# Patient Record
Sex: MALE | Race: WHITE | Employment: FULL TIME | URBAN - METROPOLITAN AREA
[De-identification: names, ages, dates, MRNs, and addresses within clinical notes are randomized per-mention and may not be internally consistent; named-entity substitution may affect disease eponyms.]

---

## 2023-03-29 ENCOUNTER — APPOINTMENT (OUTPATIENT)
Dept: CT IMAGING | Age: 28
End: 2023-03-29
Payer: COMMERCIAL

## 2023-03-29 ENCOUNTER — HOSPITAL ENCOUNTER (OUTPATIENT)
Age: 28
Setting detail: OBSERVATION
Discharge: HOME | End: 2023-03-31
Attending: EMERGENCY MEDICINE | Admitting: INTERNAL MEDICINE
Payer: COMMERCIAL

## 2023-03-29 ENCOUNTER — APPOINTMENT (OUTPATIENT)
Dept: GENERAL RADIOLOGY | Age: 28
End: 2023-03-29
Payer: COMMERCIAL

## 2023-03-29 DIAGNOSIS — R07.9 CHEST PAIN, UNSPECIFIED TYPE: Primary | ICD-10-CM

## 2023-03-29 DIAGNOSIS — R77.8 ELEVATED TROPONIN: ICD-10-CM

## 2023-03-29 LAB
ALBUMIN SERPL-MCNC: 4.9 G/DL (ref 3.5–4.6)
ALP SERPL-CCNC: 83 U/L (ref 35–104)
ALT SERPL-CCNC: 29 U/L (ref 0–41)
AMPHET UR QL SCN: NORMAL
ANION GAP SERPL CALCULATED.3IONS-SCNC: 10 MEQ/L (ref 9–15)
AST SERPL-CCNC: 24 U/L (ref 0–40)
BARBITURATES UR QL SCN: NORMAL
BASOPHILS # BLD: 0 K/UL (ref 0–0.2)
BASOPHILS NFR BLD: 0.6 %
BENZODIAZ UR QL SCN: NORMAL
BILIRUB SERPL-MCNC: 0.4 MG/DL (ref 0.2–0.7)
BUN SERPL-MCNC: 19 MG/DL (ref 6–20)
CALCIUM SERPL-MCNC: 9.9 MG/DL (ref 8.5–9.9)
CANNABINOIDS UR QL SCN: NORMAL
CHLORIDE SERPL-SCNC: 104 MEQ/L (ref 95–107)
CK SERPL-CCNC: 141 U/L (ref 0–190)
CO2 SERPL-SCNC: 27 MEQ/L (ref 20–31)
COCAINE UR QL SCN: NORMAL
CREAT SERPL-MCNC: 0.98 MG/DL (ref 0.7–1.2)
DRUG SCREEN COMMENT UR-IMP: NORMAL
EOSINOPHIL # BLD: 0.1 K/UL (ref 0–0.7)
EOSINOPHIL NFR BLD: 1.2 %
ERYTHROCYTE [DISTWIDTH] IN BLOOD BY AUTOMATED COUNT: 13 % (ref 11.5–14.5)
FENTANYL SCREEN, URINE: NORMAL
GLOBULIN SER CALC-MCNC: 2.7 G/DL (ref 2.3–3.5)
GLUCOSE SERPL-MCNC: 81 MG/DL (ref 70–99)
HCT VFR BLD AUTO: 43.9 % (ref 42–52)
HGB BLD-MCNC: 14.9 G/DL (ref 14–18)
INR PPP: 1
LYMPHOCYTES # BLD: 1.8 K/UL (ref 1–4.8)
LYMPHOCYTES NFR BLD: 25.2 %
MCH RBC QN AUTO: 28.3 PG (ref 27–31.3)
MCHC RBC AUTO-ENTMCNC: 34 % (ref 33–37)
MCV RBC AUTO: 83.3 FL (ref 79–92.2)
METHADONE UR QL SCN: NORMAL
MONOCYTES # BLD: 0.4 K/UL (ref 0.2–0.8)
MONOCYTES NFR BLD: 5.9 %
NEUTROPHILS # BLD: 4.9 K/UL (ref 1.4–6.5)
NEUTS SEG NFR BLD: 67.1 %
OPIATES UR QL SCN: NORMAL
OXYCODONE UR QL SCN: NORMAL
PCP UR QL SCN: NORMAL
PLATELET # BLD AUTO: 263 K/UL (ref 130–400)
POTASSIUM SERPL-SCNC: 4.3 MEQ/L (ref 3.4–4.9)
PROPOXYPH UR QL SCN: NORMAL
PROT SERPL-MCNC: 7.6 G/DL (ref 6.3–8)
PROTHROMBIN TIME: 13 SEC (ref 12.3–14.9)
RBC # BLD AUTO: 5.26 M/UL (ref 4.7–6.1)
SODIUM SERPL-SCNC: 141 MEQ/L (ref 135–144)
TROPONIN T SERPL-MCNC: 0.04 NG/ML (ref 0–0.01)
TROPONIN T SERPL-MCNC: 0.05 NG/ML (ref 0–0.01)
TROPONIN T SERPL-MCNC: 0.08 NG/ML (ref 0–0.01)
WBC # BLD AUTO: 7.3 K/UL (ref 4.8–10.8)

## 2023-03-29 PROCEDURE — 71275 CT ANGIOGRAPHY CHEST: CPT

## 2023-03-29 PROCEDURE — G0378 HOSPITAL OBSERVATION PER HR: HCPCS

## 2023-03-29 PROCEDURE — 36415 COLL VENOUS BLD VENIPUNCTURE: CPT

## 2023-03-29 PROCEDURE — 2580000003 HC RX 258: Performed by: INTERNAL MEDICINE

## 2023-03-29 PROCEDURE — 6370000000 HC RX 637 (ALT 250 FOR IP): Performed by: EMERGENCY MEDICINE

## 2023-03-29 PROCEDURE — 84484 ASSAY OF TROPONIN QUANT: CPT

## 2023-03-29 PROCEDURE — 71046 X-RAY EXAM CHEST 2 VIEWS: CPT

## 2023-03-29 PROCEDURE — 99285 EMERGENCY DEPT VISIT HI MDM: CPT

## 2023-03-29 PROCEDURE — 6360000004 HC RX CONTRAST MEDICATION: Performed by: EMERGENCY MEDICINE

## 2023-03-29 PROCEDURE — 85610 PROTHROMBIN TIME: CPT

## 2023-03-29 PROCEDURE — 85025 COMPLETE CBC W/AUTO DIFF WBC: CPT

## 2023-03-29 PROCEDURE — 93005 ELECTROCARDIOGRAM TRACING: CPT

## 2023-03-29 PROCEDURE — 82550 ASSAY OF CK (CPK): CPT

## 2023-03-29 PROCEDURE — 80053 COMPREHEN METABOLIC PANEL: CPT

## 2023-03-29 PROCEDURE — 80307 DRUG TEST PRSMV CHEM ANLYZR: CPT

## 2023-03-29 RX ORDER — ENOXAPARIN SODIUM 100 MG/ML
40 INJECTION SUBCUTANEOUS DAILY
Status: DISCONTINUED | OUTPATIENT
Start: 2023-03-30 | End: 2023-03-31 | Stop reason: HOSPADM

## 2023-03-29 RX ORDER — ONDANSETRON 2 MG/ML
4 INJECTION INTRAMUSCULAR; INTRAVENOUS EVERY 6 HOURS PRN
Status: DISCONTINUED | OUTPATIENT
Start: 2023-03-29 | End: 2023-03-31 | Stop reason: HOSPADM

## 2023-03-29 RX ORDER — POLYETHYLENE GLYCOL 3350 17 G/17G
17 POWDER, FOR SOLUTION ORAL DAILY PRN
Status: DISCONTINUED | OUTPATIENT
Start: 2023-03-29 | End: 2023-03-31 | Stop reason: HOSPADM

## 2023-03-29 RX ORDER — SODIUM CHLORIDE 0.9 % (FLUSH) 0.9 %
5-40 SYRINGE (ML) INJECTION EVERY 12 HOURS SCHEDULED
Status: DISCONTINUED | OUTPATIENT
Start: 2023-03-29 | End: 2023-03-31 | Stop reason: HOSPADM

## 2023-03-29 RX ORDER — ACETAMINOPHEN 325 MG/1
650 TABLET ORAL EVERY 6 HOURS PRN
Status: DISCONTINUED | OUTPATIENT
Start: 2023-03-29 | End: 2023-03-31 | Stop reason: HOSPADM

## 2023-03-29 RX ORDER — SODIUM CHLORIDE 9 MG/ML
25 INJECTION, SOLUTION INTRAVENOUS PRN
Status: DISCONTINUED | OUTPATIENT
Start: 2023-03-29 | End: 2023-03-31 | Stop reason: HOSPADM

## 2023-03-29 RX ORDER — ACETAMINOPHEN 650 MG/1
650 SUPPOSITORY RECTAL EVERY 6 HOURS PRN
Status: DISCONTINUED | OUTPATIENT
Start: 2023-03-29 | End: 2023-03-31 | Stop reason: HOSPADM

## 2023-03-29 RX ORDER — SODIUM CHLORIDE 0.9 % (FLUSH) 0.9 %
5-40 SYRINGE (ML) INJECTION PRN
Status: DISCONTINUED | OUTPATIENT
Start: 2023-03-29 | End: 2023-03-31 | Stop reason: HOSPADM

## 2023-03-29 RX ORDER — ONDANSETRON 4 MG/1
4 TABLET, ORALLY DISINTEGRATING ORAL EVERY 8 HOURS PRN
Status: DISCONTINUED | OUTPATIENT
Start: 2023-03-29 | End: 2023-03-31 | Stop reason: HOSPADM

## 2023-03-29 RX ORDER — ASPIRIN 81 MG/1
324 TABLET, CHEWABLE ORAL ONCE
Status: COMPLETED | OUTPATIENT
Start: 2023-03-29 | End: 2023-03-29

## 2023-03-29 RX ORDER — NITROGLYCERIN 0.4 MG/1
0.4 TABLET SUBLINGUAL EVERY 5 MIN PRN
Status: DISCONTINUED | OUTPATIENT
Start: 2023-03-29 | End: 2023-03-30 | Stop reason: SDUPTHER

## 2023-03-29 RX ADMIN — Medication 10 ML: at 22:16

## 2023-03-29 RX ADMIN — ASPIRIN 81 MG 324 MG: 81 TABLET ORAL at 18:08

## 2023-03-29 RX ADMIN — NITROGLYCERIN 0.4 MG: 0.4 TABLET SUBLINGUAL at 18:20

## 2023-03-29 RX ADMIN — IOPAMIDOL 75 ML: 612 INJECTION, SOLUTION INTRAVENOUS at 17:26

## 2023-03-29 RX ADMIN — NITROGLYCERIN 0.4 MG: 0.4 TABLET SUBLINGUAL at 18:11

## 2023-03-29 ASSESSMENT — ENCOUNTER SYMPTOMS
ABDOMINAL PAIN: 0
CHEST TIGHTNESS: 0
EYE PAIN: 0
SORE THROAT: 0
SHORTNESS OF BREATH: 0
VOMITING: 0
WHEEZING: 0
NAUSEA: 0

## 2023-03-29 ASSESSMENT — PAIN DESCRIPTION - ONSET: ONSET: ON-GOING

## 2023-03-29 ASSESSMENT — PAIN DESCRIPTION - LOCATION: LOCATION: CHEST

## 2023-03-29 ASSESSMENT — PAIN SCALES - GENERAL: PAINLEVEL_OUTOF10: 4

## 2023-03-29 ASSESSMENT — PAIN - FUNCTIONAL ASSESSMENT: PAIN_FUNCTIONAL_ASSESSMENT: 0-10

## 2023-03-29 ASSESSMENT — PAIN DESCRIPTION - FREQUENCY: FREQUENCY: CONTINUOUS

## 2023-03-29 ASSESSMENT — PAIN DESCRIPTION - DESCRIPTORS: DESCRIPTORS: SHARP

## 2023-03-29 NOTE — ED PROVIDER NOTES
surgical history on file. CURRENT MEDICATIONS       Previous Medications    No medications on file       ALLERGIES     Patient has no allergy information on record. FAMILY HISTORY     No family history on file. SOCIAL HISTORY       Social History     Socioeconomic History    Marital status: Single       SCREENINGS                        PHYSICAL EXAM    (up to 7 for level 4, 8 or more for level 5)     ED Triage Vitals [03/29/23 1446]   BP Temp Temp Source Heart Rate Resp SpO2 Height Weight   (!) 147/70 98.6 °F (37 °C) Temporal 84 18 96 % 6' (1.829 m) 180 lb (81.6 kg)       Physical Exam  Vitals and nursing note reviewed. Constitutional:       General: He is not in acute distress. Appearance: He is well-developed. He is not ill-appearing or diaphoretic. HENT:      Head: Normocephalic and atraumatic. Right Ear: External ear normal.      Left Ear: External ear normal.      Nose: Nose normal.      Mouth/Throat:      Mouth: Mucous membranes are moist.      Pharynx: No oropharyngeal exudate. Eyes:      Extraocular Movements: Extraocular movements intact. Conjunctiva/sclera: Conjunctivae normal.      Pupils: Pupils are equal, round, and reactive to light. Neck:      Thyroid: No thyromegaly. Vascular: No JVD. Trachea: No tracheal deviation. Cardiovascular:      Rate and Rhythm: Normal rate and regular rhythm. Heart sounds: Normal heart sounds. No murmur heard. Pulmonary:      Effort: Pulmonary effort is normal. No respiratory distress. Breath sounds: Normal breath sounds. No wheezing. Abdominal:      General: Bowel sounds are normal.      Palpations: Abdomen is soft. Tenderness: There is no abdominal tenderness. There is no guarding. Musculoskeletal:         General: Normal range of motion. Cervical back: Normal range of motion and neck supple. Right lower leg: No edema. Left lower leg: No edema. Skin:     General: Skin is warm and dry. Physician            Percy Avila DO  03/29/23 3089

## 2023-03-29 NOTE — ED NOTES
Pt grandmother and Father at bedside for update. Pt has asperger's syndrome.  Unable to fully understand explanations     Iwona Bell LPN  15/97/29 Marino Villegas LPN  47/32/67 [id number]

## 2023-03-29 NOTE — ED NOTES
Pt states was at work and chest started to hurt. Pt states a \"stinging\" in his chest. Pt denies cardiac hx, but has hx of anxiety. Pt states has felt this kind of pain before.      Wallace Olivier  03/29/23 2872

## 2023-03-30 VITALS
DIASTOLIC BLOOD PRESSURE: 80 MMHG | TEMPERATURE: 98.2 F | WEIGHT: 180 LBS | BODY MASS INDEX: 24.38 KG/M2 | HEIGHT: 72 IN | SYSTOLIC BLOOD PRESSURE: 136 MMHG | HEART RATE: 87 BPM | OXYGEN SATURATION: 97 % | RESPIRATION RATE: 16 BRPM

## 2023-03-30 LAB
B PARAP IS1001 DNA NPH QL NAA+NON-PROBE: NOT DETECTED
B PERT.PT PRMT NPH QL NAA+NON-PROBE: NOT DETECTED
BASOPHILS # BLD: 0 K/UL (ref 0–0.2)
BASOPHILS NFR BLD: 1.1 %
C PNEUM DNA NPH QL NAA+NON-PROBE: NOT DETECTED
EKG ATRIAL RATE: 68 BPM
EKG ATRIAL RATE: 74 BPM
EKG P AXIS: 51 DEGREES
EKG P AXIS: 63 DEGREES
EKG P-R INTERVAL: 188 MS
EKG P-R INTERVAL: 192 MS
EKG Q-T INTERVAL: 386 MS
EKG Q-T INTERVAL: 400 MS
EKG QRS DURATION: 88 MS
EKG QRS DURATION: 90 MS
EKG QTC CALCULATION (BAZETT): 425 MS
EKG QTC CALCULATION (BAZETT): 428 MS
EKG R AXIS: 17 DEGREES
EKG R AXIS: 5 DEGREES
EKG T AXIS: 40 DEGREES
EKG T AXIS: 43 DEGREES
EKG VENTRICULAR RATE: 68 BPM
EKG VENTRICULAR RATE: 74 BPM
EOSINOPHIL # BLD: 0.2 K/UL (ref 0–0.7)
EOSINOPHIL NFR BLD: 3.5 %
ERYTHROCYTE [DISTWIDTH] IN BLOOD BY AUTOMATED COUNT: 13.3 % (ref 11.5–14.5)
FLUAV RNA NPH QL NAA+NON-PROBE: NOT DETECTED
FLUBV RNA NPH QL NAA+NON-PROBE: NOT DETECTED
HADV DNA NPH QL NAA+NON-PROBE: NOT DETECTED
HCOV 229E RNA NPH QL NAA+NON-PROBE: NOT DETECTED
HCOV HKU1 RNA NPH QL NAA+NON-PROBE: NOT DETECTED
HCOV NL63 RNA NPH QL NAA+NON-PROBE: NOT DETECTED
HCOV OC43 RNA NPH QL NAA+NON-PROBE: NOT DETECTED
HCT VFR BLD AUTO: 42.4 % (ref 42–52)
HGB BLD-MCNC: 14.4 G/DL (ref 14–18)
HMPV RNA NPH QL NAA+NON-PROBE: NOT DETECTED
HPIV1 RNA NPH QL NAA+NON-PROBE: NOT DETECTED
HPIV2 RNA NPH QL NAA+NON-PROBE: NOT DETECTED
HPIV3 RNA NPH QL NAA+NON-PROBE: NOT DETECTED
HPIV4 RNA NPH QL NAA+NON-PROBE: NOT DETECTED
LV EF: 60 %
LVEF MODALITY: NORMAL
LYMPHOCYTES # BLD: 1.3 K/UL (ref 1–4.8)
LYMPHOCYTES NFR BLD: 30.3 %
M PNEUMO DNA NPH QL NAA+NON-PROBE: NOT DETECTED
MCH RBC QN AUTO: 28.1 PG (ref 27–31.3)
MCHC RBC AUTO-ENTMCNC: 33.9 % (ref 33–37)
MCV RBC AUTO: 82.9 FL (ref 79–92.2)
MONOCYTES # BLD: 0.4 K/UL (ref 0.2–0.8)
MONOCYTES NFR BLD: 8 %
NEUTROPHILS # BLD: 2.5 K/UL (ref 1.4–6.5)
NEUTS SEG NFR BLD: 57.1 %
PERFORMED ON: NORMAL
PLATELET # BLD AUTO: 237 K/UL (ref 130–400)
POC CREATININE: 1 MG/DL (ref 0.8–1.3)
POC SAMPLE TYPE: NORMAL
RBC # BLD AUTO: 5.12 M/UL (ref 4.7–6.1)
RSV RNA NPH QL NAA+NON-PROBE: NOT DETECTED
RV+EV RNA NPH QL NAA+NON-PROBE: NOT DETECTED
SARS-COV-2 RNA NPH QL NAA+NON-PROBE: NOT DETECTED
WBC # BLD AUTO: 4.4 K/UL (ref 4.8–10.8)

## 2023-03-30 PROCEDURE — G0378 HOSPITAL OBSERVATION PER HR: HCPCS

## 2023-03-30 PROCEDURE — 96372 THER/PROPH/DIAG INJ SC/IM: CPT

## 2023-03-30 PROCEDURE — 94762 N-INVAS EAR/PLS OXIMTRY CONT: CPT

## 2023-03-30 PROCEDURE — 93017 CV STRESS TEST TRACING ONLY: CPT

## 2023-03-30 PROCEDURE — 6370000000 HC RX 637 (ALT 250 FOR IP): Performed by: INTERNAL MEDICINE

## 2023-03-30 PROCEDURE — 2580000003 HC RX 258: Performed by: INTERNAL MEDICINE

## 2023-03-30 PROCEDURE — 93005 ELECTROCARDIOGRAM TRACING: CPT

## 2023-03-30 PROCEDURE — 0202U NFCT DS 22 TRGT SARS-COV-2: CPT

## 2023-03-30 PROCEDURE — 6360000002 HC RX W HCPCS: Performed by: INTERNAL MEDICINE

## 2023-03-30 PROCEDURE — 93306 TTE W/DOPPLER COMPLETE: CPT

## 2023-03-30 PROCEDURE — 36415 COLL VENOUS BLD VENIPUNCTURE: CPT

## 2023-03-30 PROCEDURE — 85025 COMPLETE CBC W/AUTO DIFF WBC: CPT

## 2023-03-30 RX ORDER — NITROGLYCERIN 0.4 MG/1
0.4 TABLET SUBLINGUAL EVERY 5 MIN PRN
Status: DISCONTINUED | OUTPATIENT
Start: 2023-03-30 | End: 2023-03-31 | Stop reason: HOSPADM

## 2023-03-30 RX ORDER — ASPIRIN 81 MG/1
81 TABLET ORAL DAILY
Status: DISCONTINUED | OUTPATIENT
Start: 2023-03-30 | End: 2023-03-31 | Stop reason: HOSPADM

## 2023-03-30 RX ADMIN — ENOXAPARIN SODIUM 40 MG: 100 INJECTION SUBCUTANEOUS at 09:45

## 2023-03-30 RX ADMIN — ASPIRIN 81 MG: 81 TABLET, COATED ORAL at 09:45

## 2023-03-30 RX ADMIN — Medication 10 ML: at 21:22

## 2023-03-30 RX ADMIN — Medication 5 ML: at 09:00

## 2023-03-30 ASSESSMENT — PAIN SCALES - GENERAL
PAINLEVEL_OUTOF10: 0
PAINLEVEL_OUTOF10: 0

## 2023-03-30 NOTE — CARE COORDINATION
Case Management Assessment  Initial Evaluation    Date/Time of Evaluation: 3/30/2023 10:49 AM  Assessment Completed by: Chris Tran RN    If patient is discharged prior to next notation, then this note serves as note for discharge by case management. Patient Name: Lobito Schmidt                   YOB: 1995  Diagnosis: Chest pain [R07.9]  Elevated troponin [R77.8]  Chest pain, unspecified type [R07.9]                   Date / Time: 3/29/2023  4:11 PM    Patient Admission Status: Observation   Readmission Risk (Low < 19, Mod (19-27), High > 27): No data recorded  Current PCP: No primary care provider on file. PCP verified by CM? No (PCP LIST OFFERED. CHOSE ANY PCP AT [address] [place] [address])    Chart Reviewed: Yes      History Provided by: Patient  Patient Orientation: Alert and Oriented, Place, Situation, Person, Self    Patient Cognition: Alert    Hospitalization in the last 30 days (Readmission):  No    If yes, Readmission Assessment in CM Navigator will be completed. Advance Directives:      Code Status: Full Code   Patient's Primary Decision Maker is: Legal Next of Kin    Primary Decision Maker: Christa Triplett - [phone]    Discharge Planning:    Patient lives with: Family Members Type of Home: House  Primary Care Giver: Self  Patient Support Systems include: Parent, Family Members   Current Financial resources:    Current community resources:    Current services prior to admission: None            Current DME:              Type of Home Care services:  None    ADLS  Prior functional level: Independent in ADLs/IADLs  Current functional level: Independent in ADLs/IADLs    PT AM-PAC:   /24  OT AM-PAC:   /24    Family can provide assistance at DC: Yes  Would you like Case Management to discuss the discharge plan with any other family members/significant others, and if so, who?  No  Plans to Return to Present Housing: Yes  Other Identified Issues/Barriers to RETURNING to current housing: Writer contacted Dr. Taisha Vuong office at [phone] to request pre op orders. No answer as office closes at 3:30 pm on Wednesdays. Voicemail message left to fax pre op orders with name of performing surgeon, requirements for pre op, labs etc., type of surgery, date and location of surgery  to fax #[phone].   Noemy Noe RN N/A  Potential Assistance needed at discharge: N/A            Potential DME:    Patient expects to discharge to: House  Plan for transportation at discharge:      Financial    Payor: [address]  [address] / Plan: [address]  [address] / Product Type: *No Product type* /     Does insurance require precert for SNF: Yes    Potential assistance Purchasing Medications:    Meds-to-Beds request: Yes    [facility] [place] [address]    Notes:    Factors facilitating achievement of predicted outcomes: Friend support, Motivated, and Cooperative    Barriers to discharge: N/A    Additional Case Management Notes: HOME WITH DAD. INDEPENDENT. NO 02. The Plan for Transition of Care is related to the following treatment goals of Chest pain [R07.9]  Elevated troponin [R77.8]  Chest pain, unspecified type [J76.3]    IF APPLICABLE: The Patient and/or patient representative Annita Browning and his family were provided with a choice of provider and agrees with the discharge plan. Freedom of choice list with basic dialogue that supports the patient's individualized plan of care/goals and shares the quality data associated with the providers was provided to:     Patient Representative Name:       The Patient and/or Patient Representative Agree with the Discharge Plan?       Neville Jo RN  Case Management Department

## 2023-03-30 NOTE — PROGRESS NOTES
Patient while down for the stress test discussed with this nurse that he may need to speak to psych about his very specific and vivid dreams. He described a dream where he was watching flesh burn and could hear people screaming and that they were realistic enough to think that is was real. He also described a time where he thought he was being electrocuted and was yelling he was innocent.  Migue Sol RN was notified of these concerns and plans to discuss with the hospitalist.

## 2023-03-30 NOTE — PROGRESS NOTES
Hx,allergies and medications reviewed. Procedure explained and informed consent obtained. Tolerated treadmill well for 11:37 minutes. Reached 95 % target HR. SOB noted. Returned to baseline in recovery. Denies chest pain or pressure. EKG shows no noted ectopy. Doctor to further review and interpret results.

## 2023-03-30 NOTE — CONSULTS
No femoral bruits. Good distal pulses. Edema. Negative Homans' sign. No calf tenderness elicited. Patient underwent CT scanning of the chest which for the most part is unremarkable. No evidence of pulmonary embolism nor dissection. No pleural effusions. EKG--as described above. First 1 showed a great deal of lead reversal.  Other 1 this morning show evidence of early repolarization without reciprocal changes. Monitor overnight did show evidence of Wenckebach phenomenon. Other lab work is unremarkable. His drug screen was negative. Troponins have a small trend of elevation, CPK is normal.    Past medical history: Outside of the patient's Asperger's, negative. She has no prior hospital admissions. Social history:  As described above, no evidence of tobacco abuse, alcohol abuse, or drug abuse. Patient lives at home with his father. Other review of systems:  Patient apparently does snore. However, no diagnosis of obstructive apnea is noted    Assessment:  Patient has mild elevation of troponins, but normal CPK. EKG shows early repolarization, but not necessarily ischemia. Patient also has Adrian Chalet phenomena at night. His history is not consistent with classic angina, and given his age, he has relatively few risk factors for coronary artery disease. Dissections are rare, but still possibility exists. EKG I agree with the interpretation of early repolarization and not necessarily ischemia as there is no reciprocal changes. We can block at night could be consistent with obstructive sleep apnea-the patient does snore. He does not have the typical body habitus, but studies show that roughly 40% of patients who have sleep apnea do not necessarily have to be particularly overweight. Plan:   This patient at least needs an echocardiogram to look for structural abnormalities  Patient deserves a least a stress test to look for evidence of ischemia  Patient also should have at least a

## 2023-03-30 NOTE — H&P
Hospitalist Group   History and Physical      CHIEF COMPLAINT: Chest pain    History of Present Illness:  28 y.o. male with no past medical history presents with chest pain. Patient was at work doing exertion when he started having sharp pain in the center of his chest.  Pain does not radiate anywhere. No associated symptoms other than mild nausea. No shortness of breath and no diaphoresis. Pain improved and happened again with more intensity while he was at rest.  He said that total of 4 times it happened but in between it was continuous with mild aching. He denied dizziness. Patient said that he had similar episode couple of years ago when he was exerting himself as well. Patient denied working out or exercising. Denied drug or tobacco use. No regular alcohol use. He mentioned that he drinks energy drinks regularly and he had 2 energy drinks earlier today. Patient also mentioned that sometimes he feels palpitation after drinking energy drinks. However, this time it was not palpitation that he felt. No family history of early onset cardiac disease. REVIEW OF SYSTEMS:  no fevers, chills, cp, sob, n/v, ha, vision/hearing changes, wt changes, hot/cold flashes, other open skin lesions, diarrhea, constipation, dysuria/hematuria unless noted in HPI. Complete ROS performed with the patient and is otherwise negative. PMH:  No past medical history on file. Surgical History:  No past surgical history on file. Medications Prior to Admission:    Prior to Admission medications    Not on File       Allergies:    Patient has no allergy information on record. Social History:        Family History:   No family history on file.     PHYSICAL EXAM:  Vitals:  BP (!) 109/90   Pulse 71   Temp 98.6 °F (37 °C) (Temporal)   Resp 12   Ht 6' (1.829 m)   Wt 180 lb (81.6 kg)   SpO2 98%   BMI 24.41 kg/m²   General Appearance: alert and oriented to person, place and time, well developed and well- nourished, Mediastinum: No evidence of mediastinal lymphadenopathy. The heart and pericardium demonstrate no acute abnormality. Lungs/Pleura: The lungs are without acute process. No focal consolidation or pulmonary edema. No evidence of pleural effusion or pneumothorax. Upper Abdomen: Limited images of the upper abdomen are unremarkable. Soft Tissues/Bones: No acute bone or soft tissue abnormality. 1. No pulmonary embolism. 2.  No acute cardiopulmonary abnormality. RECOMMENDATIONS: Unavailable       EKG: Normal sinus rhythm    ASSESSMENT/ PLAN[de-identified]      Principal Problem:    Chest pain  Resolved Problems:    * No resolved hospital problems. *      Chest pain-no associated symptoms. Occurred with exertion and again at rest.  No cardiac history and no early onset family history of cardiac disease. Tenderness to palpation over the chest suggesting musculoskeletal causes. However, he had mild elevation of troponin. We will repeat troponin and repeat EKG. Will consult cardiology. We will also check CK. Keep patient under telemetry. Code Status: Full  DVT prophylaxis: Lovenox    Electronically signed by Mesfin Matthews MD on 3/29/2023 at 10:06 PM      NOTE: This report was transcribed using voice recognition software. Every effort was made to ensure accuracy; however, inadvertent computerized transcription errors may be present.

## 2023-03-30 NOTE — PROCEDURES
[address]                      [address], [address] [facility]                              CARDIAC STRESS TEST    PATIENT NAME: Kenneth Almanzar                      :        1995  MED REC NO:   [id number]                            ROOM:       W192  ACCOUNT NO:   [de-identified]                           ADMIT DATE: 2023  PROVIDER:     Suzy Alcaraz MD    CARDIOVASCULAR DIAGNOSTIC DEPARTMENT    DATE OF STUDY:  2023    EXERCISE CARDIAC TREADMILL STRESS TEST    INDICATIONS:  Chest pain, abnormal EKG. TECHNIQUE RESULTS:  Standard exercise cardiac treadmill stress testing  was performed. Dalton protocol. Resting heart rate and blood pressure  were 78 beats per minute and 138/94 respectively. Resting  electrocardiogram revealed sinus rhythm, incomplete right bundle-branch  block, low-voltage, poor R-wave anterior progression, nonspecific ST-T  wave changes. The patient exercised for 11 minutes 37 seconds achieving  95% of maximum predicted heart rate for age 13.4 METs. Maximum heart  rate was 184 beats per minute. Maximum blood pressure was 200/83. The  patient had no chest pain symptoms or dysrhythmias other than isolated  PVCs. There was no supraventricular or atrial fibrillation noted. There was a normal recovery phase. No ischemic EKG changes or  dysrhythmias otherwise. RESULTS:  1.  Negative exercise cardiac treadmill stress test.  2.  No exercise provoked ischemic EKG changes or chest pain symptoms  with maximal stress. 3.  Hypertensive response to exercise. 4.  Isolated PVCs as noted. 5.  Abnormal resting electrocardiogram.    COMMENTS:  Clinical correlation is advised. This is a negative with  stress test with a low likelihood for significant underlying coronary  artery disease.         Mamadou Arambula MD    D: 2023 #13:34:33       T: 2023 13:37:10     [email or web address]  Job#: [id number]     Doc#: [id number]    CC:

## 2023-03-30 NOTE — PROGRESS NOTES
Hospitalist Progress Note      PCP: No primary care provider on file. Date of Admission: 3/29/2023    Chief Complaint:  afebrile, stable HD, had episodes of Mobitz type 1  AV block overnight, on RA     Medications:  Reviewed    Infusion Medications    sodium chloride       Scheduled Medications    aspirin  81 mg Oral Daily    sodium chloride flush  5-40 mL IntraVENous 2 times per day    enoxaparin  40 mg SubCUTAneous Daily     PRN Meds: nitroGLYCERIN, sodium chloride flush, sodium chloride, ondansetron **OR** ondansetron, polyethylene glycol, acetaminophen **OR** acetaminophen      Intake/Output Summary (Last 24 hours) at 3/30/2023 1140  Last data filed at 3/29/2023 2216  Gross per 24 hour   Intake 10 ml   Output --   Net 10 ml       Exam:    /78   Pulse 70   Temp 97.5 °F (36.4 °C) (Oral)   Resp 16   Ht 6' (1.829 m)   Wt 180 lb (81.6 kg)   SpO2 100%   BMI 24.41 kg/m²     General appearance: appears stated age and cooperative. Respiratory:  clear to auscultation, bilaterally   Cardiovascular: Regular rate and rhythm, S1/S2   Abdomen: Soft, active bowel sounds. Musculoskeletal: No edema bilaterally. Labs:   Recent Labs     03/29/23  1522 03/30/23  0540   WBC 7.3 4.4*   HGB 14.9 14.4   HCT 43.9 42.4    237     Recent Labs     03/29/23  1522 03/29/23  1701     --    K 4.3  --      --    CO2 27  --    BUN 19  --    CREATININE 0.98 1.0   CALCIUM 9.9  --      Recent Labs     03/29/23  1522   AST 24   ALT 29   BILITOT 0.4   ALKPHOS 83     Recent Labs     03/29/23  1700   INR 1.0     Recent Labs     03/29/23  1522 03/29/23  1700 03/29/23  2210   CKTOTAL  --   --  141   TROPONINI 0.036* 0.051* 0.076*       Urinalysis:    No results found for: NITRU, WBCUA, BACTERIA, RBCUA, BLOODU, SPECGRAV, GLUCOSEU    Radiology:  CTA CHEST W WO CONTRAST PE Eval   Final Result   1. No pulmonary embolism. 2.  No acute cardiopulmonary abnormality.       RECOMMENDATIONS:   Unavailable         XR CHEST (2 VW)   Final Result   No acute cardiopulmonary disease.                  Assessment/Plan:    27 y/o man with history of Asperger's who presented with :    Chest pain and elevated troponins  - plan for stress-test and TTE today  - on ASA  - cardiology following     Mobitz type 1 AV block  - monitor on telemetry       Diet: Diet NPO Exceptions are: Sips of Water with Meds    Code Status: Full Code      Disposition - home when [facility] with cardiology          Electronically signed by Linda Shin MD on 3/30/2023 at 11:40 AM

## 2023-03-31 LAB
ALBUMIN SERPL-MCNC: 4.1 G/DL (ref 3.5–4.6)
ANION GAP SERPL CALCULATED.3IONS-SCNC: 7 MEQ/L (ref 9–15)
BASOPHILS # BLD: 0 K/UL (ref 0–0.2)
BASOPHILS NFR BLD: 1 %
BUN SERPL-MCNC: 16 MG/DL (ref 6–20)
CALCIUM SERPL-MCNC: 9.1 MG/DL (ref 8.5–9.9)
CHLORIDE SERPL-SCNC: 110 MEQ/L (ref 95–107)
CHOLEST SERPL-MCNC: 135 MG/DL (ref 0–199)
CK SERPL-CCNC: 81 U/L (ref 0–190)
CO2 SERPL-SCNC: 24 MEQ/L (ref 20–31)
CREAT SERPL-MCNC: 0.91 MG/DL (ref 0.7–1.2)
CRP SERPL HS-MCNC: <3 MG/L (ref 0–5)
EKG ATRIAL RATE: 81 BPM
EKG P AXIS: 63 DEGREES
EKG P-R INTERVAL: 180 MS
EKG Q-T INTERVAL: 368 MS
EKG QRS DURATION: 82 MS
EKG QTC CALCULATION (BAZETT): 427 MS
EKG R AXIS: 16 DEGREES
EKG T AXIS: 37 DEGREES
EKG VENTRICULAR RATE: 81 BPM
EOSINOPHIL # BLD: 0.1 K/UL (ref 0–0.7)
EOSINOPHIL NFR BLD: 3 %
ERYTHROCYTE [DISTWIDTH] IN BLOOD BY AUTOMATED COUNT: 13 % (ref 11.5–14.5)
ERYTHROCYTE [SEDIMENTATION RATE] IN BLOOD BY WESTERGREN METHOD: 2 MM (ref 0–10)
GLUCOSE SERPL-MCNC: 98 MG/DL (ref 70–99)
HCT VFR BLD AUTO: 42.7 % (ref 42–52)
HDLC SERPL-MCNC: 38 MG/DL (ref 40–59)
HGB BLD-MCNC: 14.5 G/DL (ref 14–18)
LDLC SERPL CALC-MCNC: 83 MG/DL (ref 0–129)
LYMPHOCYTES # BLD: 1.2 K/UL (ref 1–4.8)
LYMPHOCYTES NFR BLD: 31 %
MAGNESIUM SERPL-MCNC: 2.1 MG/DL (ref 1.7–2.4)
MCH RBC QN AUTO: 28.3 PG (ref 27–31.3)
MCHC RBC AUTO-ENTMCNC: 33.9 % (ref 33–37)
MCV RBC AUTO: 83.6 FL (ref 79–92.2)
MONOCYTES # BLD: 0.2 K/UL (ref 0.2–0.8)
MONOCYTES NFR BLD: 4.8 %
NEUTROPHILS # BLD: 2.3 K/UL (ref 1.4–6.5)
NEUTS SEG NFR BLD: 60 %
PHOSPHATE SERPL-MCNC: 2.1 MG/DL (ref 2.3–4.8)
PLATELET # BLD AUTO: 223 K/UL (ref 130–400)
PLATELET BLD QL SMEAR: NORMAL
POTASSIUM SERPL-SCNC: 4.3 MEQ/L (ref 3.4–4.9)
RBC # BLD AUTO: 5.11 M/UL (ref 4.7–6.1)
RBC MORPH BLD: NORMAL
SODIUM SERPL-SCNC: 141 MEQ/L (ref 135–144)
TRIGL SERPL-MCNC: 70 MG/DL (ref 0–150)
TROPONIN T SERPL-MCNC: 0.08 NG/ML (ref 0–0.01)
TSH REFLEX: 2.91 UIU/ML (ref 0.44–3.86)
WBC # BLD AUTO: 3.8 K/UL (ref 4.8–10.8)

## 2023-03-31 PROCEDURE — 6370000000 HC RX 637 (ALT 250 FOR IP): Performed by: INTERNAL MEDICINE

## 2023-03-31 PROCEDURE — 6360000002 HC RX W HCPCS: Performed by: INTERNAL MEDICINE

## 2023-03-31 PROCEDURE — 80069 RENAL FUNCTION PANEL: CPT

## 2023-03-31 PROCEDURE — 96372 THER/PROPH/DIAG INJ SC/IM: CPT

## 2023-03-31 PROCEDURE — 84484 ASSAY OF TROPONIN QUANT: CPT

## 2023-03-31 PROCEDURE — 85025 COMPLETE CBC W/AUTO DIFF WBC: CPT

## 2023-03-31 PROCEDURE — 80061 LIPID PANEL: CPT

## 2023-03-31 PROCEDURE — 84443 ASSAY THYROID STIM HORMONE: CPT

## 2023-03-31 PROCEDURE — 86140 C-REACTIVE PROTEIN: CPT

## 2023-03-31 PROCEDURE — 36415 COLL VENOUS BLD VENIPUNCTURE: CPT

## 2023-03-31 PROCEDURE — 83735 ASSAY OF MAGNESIUM: CPT

## 2023-03-31 PROCEDURE — 93005 ELECTROCARDIOGRAM TRACING: CPT

## 2023-03-31 PROCEDURE — G0378 HOSPITAL OBSERVATION PER HR: HCPCS

## 2023-03-31 PROCEDURE — 85652 RBC SED RATE AUTOMATED: CPT

## 2023-03-31 PROCEDURE — 82550 ASSAY OF CK (CPK): CPT

## 2023-03-31 RX ORDER — ASPIRIN 81 MG/1
81 TABLET ORAL DAILY
Qty: 30 TABLET | Refills: 3 | Status: SHIPPED | OUTPATIENT
Start: 2023-04-01

## 2023-03-31 RX ORDER — NITROGLYCERIN 0.4 MG/1
0.4 TABLET SUBLINGUAL EVERY 5 MIN PRN
Qty: 25 TABLET | Refills: 3 | Status: SHIPPED | OUTPATIENT
Start: 2023-03-31

## 2023-03-31 RX ADMIN — ENOXAPARIN SODIUM 40 MG: 100 INJECTION SUBCUTANEOUS at 08:23

## 2023-03-31 RX ADMIN — ASPIRIN 81 MG: 81 TABLET, COATED ORAL at 08:23

## 2023-03-31 NOTE — PROGRESS NOTES
[facility] Heart Progress Note      Patient: Priscila Llanos    Unit/Bed: Y127/I641-73  YOB: 1995  MRN: [id number]  Admit Date:  3/29/2023  Hospital Day: 0    Rounding Date: 3/31/2023    Rounding Cardiologist:  MARIA EUGENIA Russell MD    PRIMARY Cardiologist: Katarina Russell    Subjective Complaint:   Denies any chest pain with exertion or at rest, palpitations, syncope, or edema.,  Feels much better. Physical Examination:     /80   Pulse 87   Temp 98.2 °F (36.8 °C)   Resp 16   Ht 6' (1.829 m)   Wt 180 lb (81.6 kg)   SpO2 97%   BMI 24.41 kg/m²     No intake or output data in the 24 hours ending 03/31/23 [address] examined at bedside in in no apparent distress and cooperative. Focused exam reveals:     Cardiac: Heart regular rate and rhythm     Lungs:  clear to auscultation bilaterally- no wheezes, rales or rhonchi, normal air movement, no respiratory distress    Extremities:   none edema    Telemetry:      normal sinus  and no further heart block        LABS:   CBC:   Recent Labs     03/29/23  1522 03/30/23  0540 03/31/23  0544   WBC 7.3 4.4* 3.8*   HGB 14.9 14.4 14.5    237 223      BMP:    Recent Labs     03/29/23  1522 03/29/23  1701 03/31/23  0544     --  141   K 4.3  --  4.3     --  110*   CO2 27  --  24   BUN 19  --  16   CREATININE 0.98 1.0 0.91   GLUCOSE 81  --  98              Troponin: No results for input(s): TROPONINT in the last 72 hours. BNP: No results for input(s): PROBNP in the last 72 hours. INR:   Recent Labs     03/29/23  1700   INR 1.0      Mg:   Recent Labs     03/31/23  0544   MG 2.1       Cardiac Testing:     Impression:      Normal left ventricular function, LVEF 60%. Normal chamber sizes. No significant valvular heart disease noted. Normal pericardium. 1.  Negative exercise cardiac treadmill stress test.  2.  No exercise provoked ischemic EKG changes or chest pain symptoms  with maximal stress.   3.

## 2023-03-31 NOTE — PROGRESS NOTES
Final Result   No acute cardiopulmonary disease. Assessment/Plan:    27 y/o man with history of Asperger's who presented with :    Atypical chest pain and elevated troponins  - plan for stress-test and TTE today  - TTE showed preserved LVEF  - stress-test was negative for ischemia  - continue ASA on d/c per cardiology      Mobitz type 1 AV block  - no further episodes on telemetry  - plan for outpatient monitor per cardiology       Diet: ADULT DIET;  Regular    Code Status: Full Code      Disposition - home today          Electronically signed by Miguel Angel Potter MD on 3/31/2023 at 10:41 AM

## 2023-03-31 NOTE — DISCHARGE SUMMARY
Hospital Medicine Discharge Summary    Aaron Chase  :  1995  MRN:  [id number]    Admit date:  3/29/2023  Discharge date:  3/31/2023    Admitting Physician: Steffi Rodriguez MD  Primary Care Physician:  No primary care provider on file. Discharge Diagnoses:    Principal Problem:    Chest pain  Resolved Problems:    * No resolved hospital problems. *      Hospital Course:   Aaron Chase is a 28 y.o. male that was admitted and treated at [facility] for the following medical issues:     typical chest pain and elevated troponins  - plan for stress-test and TTE today  - TTE showed preserved LVEF  - stress-test was negative for ischemia  - continue ASA on d/c per cardiology      Mobitz type 1 AV block  - no further episodes on telemetry  - plan for outpatient Holter monitor per cardiology      Patient was seen by the following consultants while admitted to [facility]:   Consults:  [address]    Significant Diagnostic Studies:    XR CHEST (2 VW)    Result Date: 3/29/2023  EXAMINATION: TWO XRAY VIEWS OF THE CHEST 3/29/2023 4:28 pm COMPARISON: None. HISTORY: ORDERING SYSTEM PROVIDED HISTORY: CP TECHNOLOGIST PROVIDED HISTORY: Reason for exam:->CP What reading provider will be dictating this exam?->CRC FINDINGS: The cardiac silhouette is normal in size. The pulmonary vasculature is within normal limits. No pulmonary consolidation or collapse is identified. No pneumothorax or pleural effusion is seen. No acute cardiopulmonary disease. CTA CHEST W WO CONTRAST PE Eval    Result Date: 3/29/2023  EXAMINATION: CTA OF THE CHEST WITH AND WITHOUT CONTRAST 3/29/2023 5:22 pm TECHNIQUE: CTA of the chest was performed before and after the administration of intravenous contrast.  Multiplanar reformatted images are provided for review. MIP images are provided for review.  Automated exposure control, iterative reconstruction, and/or weight based adjustment of the mA/kV was utilized to reduce the radiation dose to as low as reasonably achievable. COMPARISON: None. HISTORY: ORDERING SYSTEM PROVIDED HISTORY: PE TECHNOLOGIST PROVIDED HISTORY: Reason for exam:->PE Decision Support Exception - unselect if not a suspected or confirmed emergency medical condition->Emergency Medical Condition (MA) What reading provider will be dictating this exam?->CRC FINDINGS: Aorta: No evidence of thoracic aortic aneurysm or dissection. No acute abnormality of the aorta. Mediastinum: No evidence of mediastinal lymphadenopathy. The heart and pericardium demonstrate no acute abnormality. Lungs/Pleura: The lungs are without acute process. No focal consolidation or pulmonary edema. No evidence of pleural effusion or pneumothorax. Upper Abdomen: Limited images of the upper abdomen are unremarkable. Soft Tissues/Bones: No acute bone or soft tissue abnormality. 1. No pulmonary embolism. 2.  No acute cardiopulmonary abnormality. RECOMMENDATIONS: Unavailable       Discharge Medications:         Medication List        START taking these medications      aspirin 81 MG EC tablet  Take 1 tablet by mouth daily  Start taking on: April 1, 2023     nitroGLYCERIN 0.4 MG SL tablet  Commonly known as: NITROSTAT  Place 1 tablet under the tongue every 5 minutes as needed for Chest pain up to max of 3 total doses. If no relief after 1 dose, call 911. Where to Get Your Medications        [id number] [address]      Phone: [phone]   aspirin 81 MG EC tablet  nitroGLYCERIN 0.4 MG SL tablet         Disposition:   Discharged to Home. Any C needs that were indicated and/or required as been addressed and set up by Social Work. Condition at discharge: Pt was medically stable at the time of discharge.  Significant improvement in clinical condition compared to initial

## 2023-04-02 LAB
EKG ATRIAL RATE: 83 BPM
EKG P-R INTERVAL: 164 MS
EKG Q-T INTERVAL: 354 MS
EKG QRS DURATION: 84 MS
EKG QTC CALCULATION (BAZETT): 415 MS
EKG R AXIS: 50 DEGREES
EKG T AXIS: 128 DEGREES
EKG VENTRICULAR RATE: 83 BPM

## 2023-04-03 LAB
EKG ATRIAL RATE: 68 BPM
EKG ATRIAL RATE: 74 BPM
EKG ATRIAL RATE: 81 BPM
EKG P AXIS: 51 DEGREES
EKG P AXIS: 63 DEGREES
EKG P AXIS: 63 DEGREES
EKG P-R INTERVAL: 180 MS
EKG P-R INTERVAL: 188 MS
EKG P-R INTERVAL: 192 MS
EKG Q-T INTERVAL: 368 MS
EKG Q-T INTERVAL: 386 MS
EKG Q-T INTERVAL: 400 MS
EKG QRS DURATION: 82 MS
EKG QRS DURATION: 88 MS
EKG QRS DURATION: 90 MS
EKG QTC CALCULATION (BAZETT): 425 MS
EKG QTC CALCULATION (BAZETT): 427 MS
EKG QTC CALCULATION (BAZETT): 428 MS
EKG R AXIS: 16 DEGREES
EKG R AXIS: 17 DEGREES
EKG R AXIS: 5 DEGREES
EKG T AXIS: 37 DEGREES
EKG T AXIS: 40 DEGREES
EKG T AXIS: 43 DEGREES
EKG VENTRICULAR RATE: 68 BPM
EKG VENTRICULAR RATE: 74 BPM
EKG VENTRICULAR RATE: 81 BPM

## 2023-04-07 ENCOUNTER — OFFICE VISIT (OUTPATIENT)
Dept: FAMILY MEDICINE CLINIC | Age: 28
End: 2023-04-07

## 2023-04-07 VITALS
WEIGHT: 193 LBS | TEMPERATURE: 97.2 F | BODY MASS INDEX: 26.14 KG/M2 | OXYGEN SATURATION: 100 % | HEART RATE: 74 BPM | HEIGHT: 72 IN | DIASTOLIC BLOOD PRESSURE: 86 MMHG | SYSTOLIC BLOOD PRESSURE: 131 MMHG

## 2023-04-07 DIAGNOSIS — Z09 HOSPITAL DISCHARGE FOLLOW-UP: ICD-10-CM

## 2023-04-07 DIAGNOSIS — F51.5 NIGHTMARES: ICD-10-CM

## 2023-04-07 DIAGNOSIS — R44.3 HALLUCINATIONS: ICD-10-CM

## 2023-04-07 DIAGNOSIS — R07.9 CHEST PAIN, UNSPECIFIED TYPE: Primary | ICD-10-CM

## 2023-04-07 SDOH — ECONOMIC STABILITY: INCOME INSECURITY: HOW HARD IS IT FOR YOU TO PAY FOR THE VERY BASICS LIKE FOOD, HOUSING, MEDICAL CARE, AND HEATING?: NOT HARD AT ALL

## 2023-04-07 SDOH — ECONOMIC STABILITY: FOOD INSECURITY: WITHIN THE PAST 12 MONTHS, YOU WORRIED THAT YOUR FOOD WOULD RUN OUT BEFORE YOU GOT MONEY TO BUY MORE.: NEVER TRUE

## 2023-04-07 SDOH — ECONOMIC STABILITY: FOOD INSECURITY: WITHIN THE PAST 12 MONTHS, THE FOOD YOU BOUGHT JUST DIDN'T LAST AND YOU DIDN'T HAVE MONEY TO GET MORE.: NEVER TRUE

## 2023-04-07 SDOH — ECONOMIC STABILITY: HOUSING INSECURITY
IN THE LAST 12 MONTHS, WAS THERE A TIME WHEN YOU DID NOT HAVE A STEADY PLACE TO SLEEP OR SLEPT IN A SHELTER (INCLUDING NOW)?: NO

## 2023-04-07 ASSESSMENT — ENCOUNTER SYMPTOMS
SHORTNESS OF BREATH: 0
WHEEZING: 0
COUGH: 0

## 2023-04-07 ASSESSMENT — PATIENT HEALTH QUESTIONNAIRE - PHQ9: DEPRESSION UNABLE TO ASSESS: PT REFUSES

## 2023-04-07 NOTE — PROGRESS NOTES
Post-Discharge Transitional Care Follow Up      Elsa Luna   YOB: 1995    Date of Office Visit:  4/7/2023  Date of Hospital Admission: 3/29/23  Date of Hospital Discharge: 3/31/23  Readmission Risk Score (high >=14%. Medium >=10%):No data recorded    Care management risk score Rising risk (score 2-5) and Complex Care (Scores >=6): No Risk Score On File     Non face to face  following discharge, date last encounter closed (first attempt may have been earlier): *No documented post hospital discharge outreach found in the last 14 days     Call initiated 2 business days of discharge: *No response recorded in the last 14 days     Chest pain, unspecified type  -     [address] [place] [address] discharge follow-up  -     GA DISCHARGE MEDS RECONCILED W/ CURRENT OUTPATIENT MED LIST  Nightmares  -     External Referral to Psychiatry  -     External Referral to Psychiatry  Hallucinations  -     External Referral to Psychiatry  -     External Referral to Psychiatry      Medical Decision Making: moderate complexity  Return in about 4 weeks (around 5/5/2023). Subjective:   HPI Pt in today to f/u on CP. Reports that he was working when it initially started. Reports he went to ER and was still having up until he had CT. He had elevated troponin. Had negative stress test, echo, and EKG which were negative. Reports he has a f/u with cardiology and holter monitor on the 12th. He reports he has been feeling good since he got out of the hospital. Had an episode a couple of nights ago. It stopped on its own but he took a nitro to help prevent any further. Pt reports he also has had terrible nightmares and dreams. He reports that he sees and hears stuff in his dreams and sometimes when he is awake throughout the day. He reports that he has seen psych in the past and they did not understand his dream. He reports that this has been going on a long time.  Pt reports

## 2023-04-12 LAB
CREATINE KINASE (U/L) IN SER/PLAS: 71 U/L (ref 0–325)
TROPONIN I, HIGH SENSITIVITY: <3 NG/L (ref 0–20)

## 2023-05-05 ENCOUNTER — OFFICE VISIT (OUTPATIENT)
Dept: FAMILY MEDICINE CLINIC | Age: 28
End: 2023-05-05
Payer: COMMERCIAL

## 2023-05-05 VITALS
BODY MASS INDEX: 26.74 KG/M2 | SYSTOLIC BLOOD PRESSURE: 124 MMHG | TEMPERATURE: 97.9 F | OXYGEN SATURATION: 98 % | WEIGHT: 197.4 LBS | HEART RATE: 83 BPM | DIASTOLIC BLOOD PRESSURE: 86 MMHG | HEIGHT: 72 IN

## 2023-05-05 DIAGNOSIS — R07.9 CHEST PAIN, UNSPECIFIED TYPE: Primary | ICD-10-CM

## 2023-05-05 DIAGNOSIS — R44.3 HALLUCINATIONS: ICD-10-CM

## 2023-05-05 DIAGNOSIS — F41.9 ANXIETY: ICD-10-CM

## 2023-05-05 DIAGNOSIS — F51.5 NIGHTMARES: ICD-10-CM

## 2023-05-05 PROCEDURE — G8427 DOCREV CUR MEDS BY ELIG CLIN: HCPCS | Performed by: NURSE PRACTITIONER

## 2023-05-05 PROCEDURE — 1036F TOBACCO NON-USER: CPT | Performed by: NURSE PRACTITIONER

## 2023-05-05 PROCEDURE — 99214 OFFICE O/P EST MOD 30 MIN: CPT | Performed by: NURSE PRACTITIONER

## 2023-05-05 PROCEDURE — G8419 CALC BMI OUT NRM PARAM NOF/U: HCPCS | Performed by: NURSE PRACTITIONER

## 2023-05-05 ASSESSMENT — ENCOUNTER SYMPTOMS
WHEEZING: 0
SHORTNESS OF BREATH: 0
COUGH: 0

## 2023-05-05 ASSESSMENT — PATIENT HEALTH QUESTIONNAIRE - PHQ9: DEPRESSION UNABLE TO ASSESS: PT REFUSES

## 2023-12-26 PROBLEM — R07.9 CHEST PAIN: Status: ACTIVE | Noted: 2023-03-29

## 2023-12-26 RX ORDER — SERTRALINE HYDROCHLORIDE 50 MG/1
50 TABLET, FILM COATED ORAL
COMMUNITY
Start: 2016-07-01

## 2023-12-26 RX ORDER — ARIPIPRAZOLE 10 MG/1
10 TABLET ORAL
COMMUNITY
Start: 2016-07-01

## 2023-12-26 RX ORDER — ASPIRIN 81 MG/1
1 TABLET ORAL DAILY
COMMUNITY
Start: 2023-04-01

## 2023-12-26 RX ORDER — NITROGLYCERIN 0.4 MG/1
0.4 TABLET SUBLINGUAL
COMMUNITY
Start: 2023-03-31

## 2024-06-26 ENCOUNTER — APPOINTMENT (OUTPATIENT)
Dept: CARDIOLOGY | Facility: CLINIC | Age: 29
End: 2024-06-26
Payer: COMMERCIAL

## 2025-05-30 ENCOUNTER — HOSPITAL ENCOUNTER (INPATIENT)
Age: 30
LOS: 5 days | Discharge: HOME | DRG: 750 | End: 2025-06-04
Admitting: PSYCHIATRY & NEUROLOGY
Payer: COMMERCIAL

## 2025-05-30 DIAGNOSIS — R45.850 HOMICIDAL IDEATION: ICD-10-CM

## 2025-05-30 DIAGNOSIS — R45.851 SUICIDAL IDEATION: Primary | ICD-10-CM

## 2025-05-30 PROBLEM — F33.2 MDD (MAJOR DEPRESSIVE DISORDER), RECURRENT SEVERE, WITHOUT PSYCHOSIS (HCC): Status: ACTIVE | Noted: 2025-05-30

## 2025-05-30 LAB
ALBUMIN SERPL-MCNC: 4.6 G/DL (ref 3.5–4.6)
ALP SERPL-CCNC: 96 U/L (ref 35–104)
ALT SERPL-CCNC: 50 U/L (ref 0–41)
AMPHET UR QL SCN: ABNORMAL
ANION GAP SERPL CALCULATED.3IONS-SCNC: 10 MEQ/L (ref 9–15)
APAP SERPL-MCNC: <5 UG/ML (ref 10–30)
AST SERPL-CCNC: 33 U/L (ref 0–40)
BARBITURATES UR QL SCN: ABNORMAL
BASOPHILS # BLD: 0 K/UL (ref 0–0.2)
BASOPHILS NFR BLD: 0.8 %
BENZODIAZ UR QL SCN: ABNORMAL
BILIRUB SERPL-MCNC: 0.6 MG/DL (ref 0.2–0.7)
BILIRUB UR QL STRIP: NEGATIVE
BUN SERPL-MCNC: 12 MG/DL (ref 6–20)
CALCIUM SERPL-MCNC: 9.7 MG/DL (ref 8.5–9.9)
CANNABINOIDS UR QL SCN: POSITIVE
CHLORIDE SERPL-SCNC: 104 MEQ/L (ref 95–107)
CHOLEST SERPL-MCNC: 173 MG/DL (ref 0–199)
CK SERPL-CCNC: 76 U/L (ref 0–190)
CLARITY UR: CLEAR
CO2 SERPL-SCNC: 24 MEQ/L (ref 20–31)
COCAINE UR QL SCN: ABNORMAL
COLOR UR: YELLOW
CREAT SERPL-MCNC: 1.06 MG/DL (ref 0.7–1.2)
DRUG SCREEN COMMENT UR-IMP: ABNORMAL
EOSINOPHIL # BLD: 0.1 K/UL (ref 0–0.7)
EOSINOPHIL NFR BLD: 1.5 %
ERYTHROCYTE [DISTWIDTH] IN BLOOD BY AUTOMATED COUNT: 12.5 % (ref 11.5–14.5)
ETHANOL PERCENT: NORMAL G/DL
ETHANOLAMINE SERPL-MCNC: <10 MG/DL (ref 0–0.08)
FENTANYL SCREEN, URINE: ABNORMAL
GLOBULIN SER CALC-MCNC: 3 G/DL (ref 2.3–3.5)
GLUCOSE SERPL-MCNC: 105 MG/DL (ref 70–99)
GLUCOSE UR STRIP-MCNC: NEGATIVE MG/DL
HCT VFR BLD AUTO: 45.3 % (ref 42–52)
HDLC SERPL-MCNC: 45 MG/DL (ref 40–59)
HGB BLD-MCNC: 15.4 G/DL (ref 14–18)
HGB UR QL STRIP: NEGATIVE
KETONES UR STRIP-MCNC: NEGATIVE MG/DL
LDLC SERPL CALC-MCNC: 107 MG/DL (ref 0–129)
LEUKOCYTE ESTERASE UR QL STRIP: NEGATIVE
LYMPHOCYTES # BLD: 1.6 K/UL (ref 1–4.8)
LYMPHOCYTES NFR BLD: 33.8 %
MCH RBC QN AUTO: 27.9 PG (ref 27–31.3)
MCHC RBC AUTO-ENTMCNC: 34 % (ref 33–37)
MCV RBC AUTO: 82.2 FL (ref 79–92.2)
METHADONE UR QL SCN: ABNORMAL
MONOCYTES # BLD: 0.4 K/UL (ref 0.2–0.8)
MONOCYTES NFR BLD: 8 %
NEUTROPHILS # BLD: 2.6 K/UL (ref 1.4–6.5)
NEUTS SEG NFR BLD: 55.5 %
NITRITE UR QL STRIP: NEGATIVE
OPIATES UR QL SCN: ABNORMAL
OXYCODONE UR QL SCN: ABNORMAL
PCP UR QL SCN: ABNORMAL
PH UR STRIP: 8 [PH] (ref 5–9)
PLATELET # BLD AUTO: 254 K/UL (ref 130–400)
POTASSIUM SERPL-SCNC: 4.5 MEQ/L (ref 3.4–4.9)
PROPOXYPH UR QL SCN: ABNORMAL
PROT SERPL-MCNC: 7.6 G/DL (ref 6.3–8)
PROT UR STRIP-MCNC: NEGATIVE MG/DL
RBC # BLD AUTO: 5.51 M/UL (ref 4.7–6.1)
SALICYLATES SERPL-MCNC: <0.3 MG/DL (ref 15–30)
SODIUM SERPL-SCNC: 138 MEQ/L (ref 135–144)
SP GR UR STRIP: 1.01 (ref 1–1.03)
TRIGL SERPL-MCNC: 107 MG/DL (ref 0–150)
TSH SERPL-MCNC: 2.76 UIU/ML (ref 0.44–3.86)
URINE REFLEX TO CULTURE: NORMAL
UROBILINOGEN UR STRIP-ACNC: 0.2 E.U./DL
WBC # BLD AUTO: 4.7 K/UL (ref 4.8–10.8)

## 2025-05-30 PROCEDURE — 82077 ASSAY SPEC XCP UR&BREATH IA: CPT

## 2025-05-30 PROCEDURE — 85025 COMPLETE CBC W/AUTO DIFF WBC: CPT

## 2025-05-30 PROCEDURE — 81003 URINALYSIS AUTO W/O SCOPE: CPT

## 2025-05-30 PROCEDURE — 6370000000 HC RX 637 (ALT 250 FOR IP): Performed by: PSYCHIATRY & NEUROLOGY

## 2025-05-30 PROCEDURE — 36415 COLL VENOUS BLD VENIPUNCTURE: CPT

## 2025-05-30 PROCEDURE — 90792 PSYCH DIAG EVAL W/MED SRVCS: CPT | Performed by: NURSE PRACTITIONER

## 2025-05-30 PROCEDURE — 84443 ASSAY THYROID STIM HORMONE: CPT

## 2025-05-30 PROCEDURE — 82550 ASSAY OF CK (CPK): CPT

## 2025-05-30 PROCEDURE — 83036 HEMOGLOBIN GLYCOSYLATED A1C: CPT

## 2025-05-30 PROCEDURE — 99283 EMERGENCY DEPT VISIT LOW MDM: CPT

## 2025-05-30 PROCEDURE — 80143 DRUG ASSAY ACETAMINOPHEN: CPT

## 2025-05-30 PROCEDURE — 80179 DRUG ASSAY SALICYLATE: CPT

## 2025-05-30 PROCEDURE — 80307 DRUG TEST PRSMV CHEM ANLYZR: CPT

## 2025-05-30 PROCEDURE — 80053 COMPREHEN METABOLIC PANEL: CPT

## 2025-05-30 PROCEDURE — 80061 LIPID PANEL: CPT

## 2025-05-30 PROCEDURE — 1240000000 HC EMOTIONAL WELLNESS R&B

## 2025-05-30 RX ORDER — HYDROXYZINE PAMOATE 50 MG/1
50 CAPSULE ORAL EVERY 6 HOURS PRN
Status: DISCONTINUED | OUTPATIENT
Start: 2025-05-30 | End: 2025-06-04 | Stop reason: HOSPADM

## 2025-05-30 RX ORDER — TRAZODONE HYDROCHLORIDE 50 MG/1
50 TABLET ORAL NIGHTLY PRN
Status: DISCONTINUED | OUTPATIENT
Start: 2025-05-30 | End: 2025-06-04 | Stop reason: HOSPADM

## 2025-05-30 RX ORDER — HYDROXYZINE PAMOATE 50 MG/1
50 CAPSULE ORAL EVERY 6 HOURS PRN
Status: CANCELLED | OUTPATIENT
Start: 2025-05-30

## 2025-05-30 RX ORDER — HALOPERIDOL 5 MG/1
5 TABLET ORAL EVERY 4 HOURS PRN
Status: DISCONTINUED | OUTPATIENT
Start: 2025-05-30 | End: 2025-05-30

## 2025-05-30 RX ORDER — BENZTROPINE MESYLATE 1 MG/ML
2 INJECTION, SOLUTION INTRAMUSCULAR; INTRAVENOUS 2 TIMES DAILY PRN
Status: DISCONTINUED | OUTPATIENT
Start: 2025-05-30 | End: 2025-06-04 | Stop reason: HOSPADM

## 2025-05-30 RX ORDER — HYDROXYZINE HYDROCHLORIDE 50 MG/ML
50 INJECTION, SOLUTION INTRAMUSCULAR EVERY 6 HOURS PRN
Status: DISCONTINUED | OUTPATIENT
Start: 2025-05-30 | End: 2025-06-04 | Stop reason: HOSPADM

## 2025-05-30 RX ORDER — PALIPERIDONE 6 MG/1
6 TABLET, EXTENDED RELEASE ORAL EVERY MORNING
Status: CANCELLED | OUTPATIENT
Start: 2025-05-31

## 2025-05-30 RX ORDER — HALOPERIDOL 5 MG/1
5 TABLET ORAL EVERY 6 HOURS PRN
Status: CANCELLED | OUTPATIENT
Start: 2025-05-30

## 2025-05-30 RX ORDER — HALOPERIDOL 5 MG/ML
5 INJECTION INTRAMUSCULAR EVERY 4 HOURS PRN
Status: DISCONTINUED | OUTPATIENT
Start: 2025-05-30 | End: 2025-05-30

## 2025-05-30 RX ORDER — HYDROXYZINE HYDROCHLORIDE 50 MG/ML
50 INJECTION, SOLUTION INTRAMUSCULAR EVERY 6 HOURS PRN
Status: CANCELLED | OUTPATIENT
Start: 2025-05-30

## 2025-05-30 RX ORDER — MAGNESIUM HYDROXIDE/ALUMINUM HYDROXICE/SIMETHICONE 120; 1200; 1200 MG/30ML; MG/30ML; MG/30ML
30 SUSPENSION ORAL PRN
Status: CANCELLED | OUTPATIENT
Start: 2025-05-30

## 2025-05-30 RX ORDER — PALIPERIDONE 6 MG/1
6 TABLET, EXTENDED RELEASE ORAL EVERY MORNING
Status: ON HOLD | COMMUNITY
End: 2025-06-04 | Stop reason: HOSPADM

## 2025-05-30 RX ORDER — MAGNESIUM HYDROXIDE/ALUMINUM HYDROXICE/SIMETHICONE 120; 1200; 1200 MG/30ML; MG/30ML; MG/30ML
30 SUSPENSION ORAL PRN
Status: DISCONTINUED | OUTPATIENT
Start: 2025-05-30 | End: 2025-06-04 | Stop reason: HOSPADM

## 2025-05-30 RX ORDER — HALOPERIDOL 5 MG/ML
5 INJECTION INTRAMUSCULAR EVERY 6 HOURS PRN
Status: DISCONTINUED | OUTPATIENT
Start: 2025-05-30 | End: 2025-06-04 | Stop reason: HOSPADM

## 2025-05-30 RX ORDER — TRAZODONE HYDROCHLORIDE 50 MG/1
50 TABLET ORAL NIGHTLY
Status: DISCONTINUED | OUTPATIENT
Start: 2025-05-30 | End: 2025-05-30

## 2025-05-30 RX ORDER — ACETAMINOPHEN 325 MG/1
650 TABLET ORAL EVERY 4 HOURS PRN
Status: CANCELLED | OUTPATIENT
Start: 2025-05-30

## 2025-05-30 RX ORDER — TRAZODONE HYDROCHLORIDE 50 MG/1
50 TABLET ORAL NIGHTLY PRN
Status: CANCELLED | OUTPATIENT
Start: 2025-05-30

## 2025-05-30 RX ORDER — BENZTROPINE MESYLATE 1 MG/ML
2 INJECTION, SOLUTION INTRAMUSCULAR; INTRAVENOUS 2 TIMES DAILY PRN
Status: CANCELLED | OUTPATIENT
Start: 2025-05-30

## 2025-05-30 RX ORDER — HALOPERIDOL 5 MG/ML
5 INJECTION INTRAMUSCULAR EVERY 6 HOURS PRN
Status: CANCELLED | OUTPATIENT
Start: 2025-05-30

## 2025-05-30 RX ORDER — HALOPERIDOL 5 MG/1
5 TABLET ORAL EVERY 6 HOURS PRN
Status: DISCONTINUED | OUTPATIENT
Start: 2025-05-30 | End: 2025-06-04 | Stop reason: HOSPADM

## 2025-05-30 RX ORDER — ACETAMINOPHEN 325 MG/1
650 TABLET ORAL EVERY 4 HOURS PRN
Status: DISCONTINUED | OUTPATIENT
Start: 2025-05-30 | End: 2025-06-04 | Stop reason: HOSPADM

## 2025-05-30 RX ADMIN — TRAZODONE HYDROCHLORIDE 50 MG: 50 TABLET ORAL at 23:17

## 2025-05-30 ASSESSMENT — SLEEP AND FATIGUE QUESTIONNAIRES
DO YOU HAVE DIFFICULTY SLEEPING: NO
DO YOU USE A SLEEP AID: NO
AVERAGE NUMBER OF SLEEP HOURS: 12

## 2025-05-30 ASSESSMENT — PATIENT HEALTH QUESTIONNAIRE - PHQ9
9. THOUGHTS THAT YOU WOULD BE BETTER OFF DEAD, OR OF HURTING YOURSELF: SEVERAL DAYS
SUM OF ALL RESPONSES TO PHQ QUESTIONS 1-9: 15
5. POOR APPETITE OR OVEREATING: MORE THAN HALF THE DAYS
SUM OF ALL RESPONSES TO PHQ QUESTIONS 1-9: 15
2. FEELING DOWN, DEPRESSED OR HOPELESS: MORE THAN HALF THE DAYS
1. LITTLE INTEREST OR PLEASURE IN DOING THINGS: MORE THAN HALF THE DAYS
8. MOVING OR SPEAKING SO SLOWLY THAT OTHER PEOPLE COULD HAVE NOTICED. OR THE OPPOSITE, BEING SO FIGETY OR RESTLESS THAT YOU HAVE BEEN MOVING AROUND A LOT MORE THAN USUAL: NOT AT ALL
SUM OF ALL RESPONSES TO PHQ QUESTIONS 1-9: 15
10. IF YOU CHECKED OFF ANY PROBLEMS, HOW DIFFICULT HAVE THESE PROBLEMS MADE IT FOR YOU TO DO YOUR WORK, TAKE CARE OF THINGS AT HOME, OR GET ALONG WITH OTHER PEOPLE: VERY DIFFICULT
6. FEELING BAD ABOUT YOURSELF - OR THAT YOU ARE A FAILURE OR HAVE LET YOURSELF OR YOUR FAMILY DOWN: MORE THAN HALF THE DAYS
3. TROUBLE FALLING OR STAYING ASLEEP: MORE THAN HALF THE DAYS
7. TROUBLE CONCENTRATING ON THINGS, SUCH AS READING THE NEWSPAPER OR WATCHING TELEVISION: MORE THAN HALF THE DAYS
SUM OF ALL RESPONSES TO PHQ QUESTIONS 1-9: 14
4. FEELING TIRED OR HAVING LITTLE ENERGY: MORE THAN HALF THE DAYS

## 2025-05-30 ASSESSMENT — LIFESTYLE VARIABLES
HOW MANY STANDARD DRINKS CONTAINING ALCOHOL DO YOU HAVE ON A TYPICAL DAY: PATIENT DOES NOT DRINK
HOW OFTEN DO YOU HAVE A DRINK CONTAINING ALCOHOL: NEVER

## 2025-05-30 ASSESSMENT — PAIN - FUNCTIONAL ASSESSMENT: PAIN_FUNCTIONAL_ASSESSMENT: NONE - DENIES PAIN

## 2025-05-30 NOTE — VIRTUAL HEALTH
Dneny Vasquez, was evaluated through a synchronous (real-time) audio-video encounter. The patient (and/or guardian if applicable) is aware that this is a billable service, which includes applicable co-pays. This virtual visit was conducted with patient's (and/or legal guardian's) consent. Patient identification was verified, and a caregiver was present when appropriate.  The patient was located at [place]Appt [place] [facility] [place]  [address] [id number]  Loc: [phone]  The provider was located at Home (City/State): [place]  Confirm you are appropriately licensed, registered, or certified to deliver care in the state where the patient is located as indicated above. If you are not or unsure, please re-schedule the visit: Yes, I confirm.   [place] Consult to Tele-Psych  Consult performed by: Jackie Cedillo APRN - CNP  Consult ordered by: Yazmin Cee MD  Reason for consult: suicidal           Total time spent on this encounter: Not billed by time    --ELENA Kim CNP on 5/30/2025 at 4:13 PM    An electronic signature was used to authenticate this note.    Denny Vasquez  [id number]  1995     EMERGENCY DEPARTMENT TELEPSYCHIATRY EVALUATION    05/30/25    Chief Complaint:  “I feel aggressive, like a raw, primal instinct. I feel a need to smash my head against a wall and go into a primal rage.”  HPI: Patient is a 29 y.o.  male who presents for mental health evaluation. Patient presented to the ED on 05/30/25 from home. Patient reported in triage that he is worried about his mental wellbeing and is not sure he will \"be able to keep it together.\" He reported thoughts of harming himself by smashing his head against a wall and said he fears he could hurt others.  Nursing note today as follows:  Father (Denny) and fathers girlfriend at bedside. Very supportive of patient. They report pt has been really down on

## 2025-05-30 NOTE — ED PROVIDER NOTES
URINALYSIS WITH REFLEX TO CULTURE   HEMOGLOBIN A1C       All other labs were within normal range or not returned as of this dictation.    EMERGENCY DEPARTMENT COURSE and DIFFERENTIAL DIAGNOSIS/MDM:     Vitals:    Vitals:    05/30/25 1457   BP: (!) 146/92   Pulse: 78   Resp: 16   Temp: 97.7 °F (36.5 °C)   SpO2: 96%   Weight: 91.4 kg (201 lb 6.4 oz)   Height: 1.753 m (5' 9\")       MDM      29-year-old male with a past medical history of having autism spectrum disorder, depression who is supposed to be on Invega presents for evaluation for intrusive thoughts about harming himself and harming others.  Patient presents voluntarily with family.  Patient states that he felt like banging his head repeatedly on the wall over the last few months, wanting to hurt himself in a bad way.  Patient also states that today at work he felt cornered like an animal and wanted to attack someone if anyone spoke to him.  Patient has no delusions or hallucinations.  Labs demonstrate positive cannabis use, but are otherwise unremarkable.  Patient has been medically cleared at this time for voluntary psychiatric inpatient evaluation.        Procedures        CRITICAL CARE TIME   Total Critical Care time was 0  minutes, excluding separately reportable procedures.  There was a high probability of clinically significant/life threatening deterioration in the patient's condition which required my urgent intervention.       FINAL IMPRESSION      1. Suicidal ideation    2. Homicidal ideation          DISPOSITION/PLAN   DISPOSITION Decision To Admit 05/30/2025 04:48:07 PM      PATIENT REFERRED TO:  No follow-up provider specified.    DISCHARGE MEDICATIONS:  New Prescriptions    No medications on file     Controlled Substances Monitoring:     DISPOSITION/PLAN   DISPOSITION Decision To Admit 05/30/2025 04:48:07 PM   DISPOSITION CONDITION Stable           (Please note that portions of this note were completed with a voice recognition program.  Efforts

## 2025-05-30 NOTE — ED TRIAGE NOTES
Pt comes to er  with his family at his side. Pt states he  is worried about his mental wellbeing and he is not sure he will be able to keep together.  Pt admits to wanting to harm himself by smashing his head against a wall.  Pt also  admits that he fears he could hurt others. Pt does currently see a therapist

## 2025-05-30 NOTE — ED NOTES
Father (Denny) and fathers girlfriend at bedside. Very supportive of patient. They report pt has been really down on himself lately. He was prescribed Invega about a year ago and stopped it for 6 months. He became depressed and was started back on it 6 weeks ago by his provider through [facility] in [place]. He has a provider named Kym and a therapist named Lady. His next appointment is June 4th. Pt called his fathers girlfriend today while a work ([employer]) to come pick him up. He was fearful that he was going to hurt himself. He reports he wanted to slam his head against the wall. Pt has been easily annoyed lately. Family reports he holds himself to a higher standard and feels like he doesn't do good enough. They said he cares to much about others and when he hears something bad happening in the world it really upsets him, especially handicapped people. No history of harming himself or others. Pt is autistic and questions if he can be manly enough like his father and grandfather.

## 2025-05-31 PROBLEM — F84.0 AUTISTIC DISORDER: Status: ACTIVE | Noted: 2025-05-31

## 2025-05-31 PROBLEM — F31.64 BIPOLAR AFFECTIVE DISORDER, MIXED, SEVERE, WITH PSYCHOTIC BEHAVIOR (HCC): Status: ACTIVE | Noted: 2025-05-31

## 2025-05-31 PROBLEM — F33.2 MDD (MAJOR DEPRESSIVE DISORDER), RECURRENT SEVERE, WITHOUT PSYCHOSIS (HCC): Status: RESOLVED | Noted: 2025-05-30 | Resolved: 2025-05-31

## 2025-05-31 LAB
ESTIMATED AVERAGE GLUCOSE: 97 MG/DL
HBA1C MFR BLD: 5 % (ref 4–6)

## 2025-05-31 PROCEDURE — 1240000000 HC EMOTIONAL WELLNESS R&B

## 2025-05-31 PROCEDURE — 6370000000 HC RX 637 (ALT 250 FOR IP): Performed by: PSYCHIATRY & NEUROLOGY

## 2025-05-31 RX ORDER — DIVALPROEX SODIUM 250 MG/1
250 TABLET, DELAYED RELEASE ORAL DAILY
Status: DISCONTINUED | OUTPATIENT
Start: 2025-05-31 | End: 2025-06-04 | Stop reason: HOSPADM

## 2025-05-31 RX ORDER — PALIPERIDONE 6 MG/1
6 TABLET, EXTENDED RELEASE ORAL DAILY
Status: DISCONTINUED | OUTPATIENT
Start: 2025-05-31 | End: 2025-06-04

## 2025-05-31 RX ORDER — PALIPERIDONE 3 MG/1
3 TABLET, EXTENDED RELEASE ORAL NIGHTLY
Status: DISCONTINUED | OUTPATIENT
Start: 2025-05-31 | End: 2025-06-04

## 2025-05-31 RX ORDER — DIVALPROEX SODIUM 500 MG/1
500 TABLET, DELAYED RELEASE ORAL NIGHTLY
Status: DISCONTINUED | OUTPATIENT
Start: 2025-05-31 | End: 2025-06-04 | Stop reason: HOSPADM

## 2025-05-31 RX ADMIN — PALIPERIDONE 6 MG: 6 TABLET, EXTENDED RELEASE ORAL at 12:06

## 2025-05-31 RX ADMIN — DIVALPROEX SODIUM 250 MG: 250 TABLET, DELAYED RELEASE ORAL at 12:06

## 2025-05-31 RX ADMIN — DIVALPROEX SODIUM 500 MG: 500 TABLET, DELAYED RELEASE ORAL at 20:41

## 2025-05-31 RX ADMIN — PALIPERIDONE 3 MG: 6 TABLET, EXTENDED RELEASE ORAL at 20:39

## 2025-05-31 ASSESSMENT — LIFESTYLE VARIABLES
HOW OFTEN DO YOU HAVE A DRINK CONTAINING ALCOHOL: NEVER
HOW MANY STANDARD DRINKS CONTAINING ALCOHOL DO YOU HAVE ON A TYPICAL DAY: PATIENT DOES NOT DRINK
HOW MANY STANDARD DRINKS CONTAINING ALCOHOL DO YOU HAVE ON A TYPICAL DAY: PATIENT DOES NOT DRINK
HOW OFTEN DO YOU HAVE A DRINK CONTAINING ALCOHOL: NEVER

## 2025-05-31 NOTE — H&P
Hospital Medicine  History and Physical    Patient:  Denny Vasquez  MRN: [id number]    CHIEF COMPLAINT:    Chief Complaint   Patient presents with    Psychiatric Evaluation     SI, HI       History Obtained From:  Patient, EMR  Primary Care Physician: No primary care provider on file.    HISTORY OF PRESENT ILLNESS:   The patient is a 29 y.o. male with PMH of depression.  The patient was admitted to the psychiatric unit due to worsening depression as well as patient having impulsive thoughts.  Somewhat delusional.  Please refer to psych note for details.    Past Medical History:  No past medical history on file.    Past Surgical History:  No past surgical history on file.    Medications Prior to Admission:    Prior to Admission medications    Medication Sig Start Date End Date Taking? Authorizing Provider   paliperidone (INVEGA) 6 MG extended release tablet Take 1 tablet by mouth every morning   Yes Provider, MD Jagruti   aspirin 81 MG EC tablet Take 1 tablet by mouth daily 4/1/23   Jose Arguello MD   nitroGLYCERIN (NITROSTAT) 0.4 MG SL tablet Place 1 tablet under the tongue every 5 minutes as needed for Chest pain up to max of 3 total doses. If no relief after 1 dose, call 911.  Patient not taking: Reported on 4/7/2023 3/31/23   Jose Arguello MD       Allergies:  Patient has no known allergies.    Social History:   TOBACCO:   reports that he has never smoked. He has never used smokeless tobacco.  ETOH:   has no history on file for alcohol use.      Family History:   No family history on file.    REVIEW OF SYSTEMS:  Ten systems reviewed and negative except for stated in HPI    Physical Exam:    Vitals: BP (!) 126/95   Pulse 78   Temp 98.6 °F (37 °C)   Resp 18   Ht 1.753 m (5' 9\")   Wt 91.4 kg (201 lb 6.4 oz)   SpO2 96%   BMI 29.74 kg/m²   General appearance: alert, appears stated age and cooperative, no apparent distress.  Patient is avoiding eye contact.  Low tone and volume speech.  Skin: 
(DESYREL) tablet 50 mg, 50 mg, Oral, Nightly PRN  haloperidol (HALDOL) tablet 5 mg, 5 mg, Oral, Q6H PRN **OR** haloperidol lactate (HALDOL) injection 5 mg, 5 mg, IntraMUSCular, Q6H PRN  hydrOXYzine pamoate (VISTARIL) capsule 50 mg, 50 mg, Oral, Q6H PRN **OR** hydrOXYzine (VISTARIL) injection 50 mg, 50 mg, IntraMUSCular, Q6H PRN     ASSESSMENT:   Bipolar disorder, mixed severe with psychotic feature  Autistic disorder    PLAN:   Collateral information  Group  Milieu  Psycho-education  Discussed about the effect of any substance use and potential for impulsivity  Continued Invega 6 mg in the morning and 3 mg at night for psychotic symptoms.  Invega could be titrated up to 12 mg if clinically indicated  Patient takes the medication religiously and does not want to take the Invega Sustenna long-acting injection.  Also started Depakote 250 mg in the morning and 500 mg at night for intense mood swing.  Needs titration if clinically indicated  Valproic acid level as ordered  Closely monitored on the unit but patient contracts safety  Expected length of stay 5 to 7 days based on stability          Behavioral Services  Medicare Certification Upon Admission    I certify that this patient's inpatient psychiatric hospital admission is medically necessary for:    [x] (1) Treatment which could reasonably be expected to improve this patient's condition,       [x] (2) Or for diagnostic study;     AND     [x](2) The inpatient psychiatric services are provided while the individual is under the care of a physician and are included in the individualized plan of care.    Estimated length of stay/service   5 to 7 days based on stability    Plan for post-hospital care   Follow-up care for medication management upon discharge in the community    Electronically signed by Azucena Beltran MD on 5/31/2025 at 11:20 AM          Signed:  Azucena Beltran MD  5/31/2025  11:16 AM

## 2025-05-31 NOTE — FLOWSHEET NOTE
Admission assessment complete.  Patient visible in dayroom playing chess with peers. Patient has flat affect, denies SI but admits to \"feeling like an animal, like really aggressive.\"  Patient admits to having intrusive thoughts of HI but states it is not toward anyone in particular.  Patient states \"I'm usually alone when these thoughts get bad but if I am not, I remove myself from that room.\"  Patient reports his appetite has declined but \"I'm okay with that.\"  Patient denies any weight loss.  Patient reports that he has been \"sleeping too much lately.\" Patient stated \"I was on medication but I stopped taking it and recently started taking it again so I feel like I am on a roller coaster.\"  Patient states he had 1 previous suicide attempt in his life time but denies suicidal thoughts at this time.  Patient appears to have difficulty concentrating at times.  He has been calm and co-operative this evening.

## 2025-05-31 NOTE — CARE COORDINATION
Psychosocial Assessment     Admission Reason: psychosis , SI and HI   - Current Suicide Risk:   [] No Risk  [] Low [x] Moderate [] High     Risk Factors:  impulses, mood swings, medications not effective     Protective Factors: work, family, friends, connected to outpt  [] Low   [x] Moderate   [] High     [] Discussed current suicide risk, protective and risk factors with treatment team to determine safety interventions as applicable.     Gender:  [x] Male [] Female [] Transgender  [] Other    Sexual Orientation:  [x] Heterosexual [] Homosexual [] Bisexual [] Other    Homicidal Ideation:  [] Past [] Present [x] Denies not recently ( does not like to be touched )     Onset and duration of problems: teenage years  Current or Past Mental Health and/or Addictions Treatment (and response to treatment):  [x] [place]   [] No    Substance Use/Alcohol Use/Addiction (document name of substance, age of onset, how much and how often, route of use and date of last use):  [] Reports [x] Denies   History of Biomedical Complications Associated with Substance Use/Abuse:  [] Reports [x] Denies  Specify:    Family History of Mental Illness or Substance Use/Abuse: mom is bipolar   [x] Yes (Specify)  [] No    Trauma and Abuse History: Patient did not provide information about trauma and abuse history.  [] Reports [x] Denies  Specify:         Legal History:  []  Yes (Specify)     [x]   Involvement:  [] Yes (Specify)  X No     Level of Education and cognitive functioning:  graduated HS, autism   Employment and/or Benefits:    [x] Yes (Specify)  [] No     Leisure & Recreational Interests and Hobbies/Coping Skills:  going o the gym and work     Ability to Complete Activities of Daily Living (ADLs):  [x] Yes  [] No (Specify)    Health Issues:see H and P   Congregation Preferences:     None []  Yes [x] (Specify)  __ _protestant _____________________:

## 2025-05-31 NOTE — GROUP NOTE
Group Therapy Note    Date: 5/31/2025    Group Start Time: 0930  Group End Time: 1100  Group Topic: Psychoeducation    ML 3W Dede Kingsley    Group Therapy Note    Anxiety Triggers    Patients will be given a list of potential anxiety triggers and asked to rate them 1-10. Patients will then be encouraged to speak about their feelings of anxiety and why these triggers cause anxiety. Patients will then be asked to give potential coping strategies to reduce the anxiety, and will be given other options if none are mentioned.     Goals: Anxiety reduction, coping strategies, social skills, identifying healthy feelings/coping strategies        Pt displayed fair insight into issues AEB verbalizing his perceived anxieties with peers/staff. Pt was pleasant and sociable with the group, and contributed to group conversation. Pt was slightly tangential and spoke about topics that were slightly off-topic to the original statement.     Attended: Full attendance  Participation Level: Active Listener and Interactive  Participation Quality: Appropriate, Attentive, and Sharing  Affect/Mood: Congruent/Euthymic  Speech: normal rate and normal volume   Insight/Judgement: Poor insight and Fair judgment  Response: Able to verbalize current knowledge/experience, Able to verbalize/acknowledge new learning, and Able to retain information    Signature: Dede Castillo, Psychoeducational Specialist

## 2025-05-31 NOTE — CARE COORDINATION
Leisure Assessment  May 31, 2025 /  0830 am    Appearance: Alert, Appears as stated age, Cooperative, In mild distress, Pleasant, and Sociable  Current Mental Status: Calm and Insight into issues  Affect/Mood: Congruent/ Euthymic  Thought Content/Processes: Linear, Tangential, Perseverative  Insight/Judgement: Fair insight and Fair judgment  Speech: normal rate and normal volume  Delusions/Hallucinations: no evidence of delusions / auditory:  commenting: Animalistic tendencies and visual:Seeing animalistic tendencies :   Admit Status: Voluntary    Pt was approached in the day area for the leisure assessment at 0830. Pt reported that he typically spends his time alone drawing, tania, and thinking about his life, and prefers to be alone. Pt reported that he has a support system, displays irrational anger with others, internalizes his feelings depending on the person, likes himself physically and does not like himself mentally, is in control of his life, can be successful and recovery is possible. Pt was perseverative about \"not trusting people and seeing the negative things they can do\", and \"that he feels animalistic or feral, and feels more at peace in the woods than at home.\" Pt reported his strengths are being humble and being himself. Pt reported that \"strengths come through actions, not words.\" Pt reported that he has limitations in social interactions. Pt reported that he was hospitalized d/t feeling like his \"senses were heightened\", and described the feeling as \"feeling feral, or going to war.\" Pt reported that he is positive for HI, AH, and VH, and was not able to give examples of AH or VH, but mentioned that he used to \"think of ways to harm others in grotesque ways when people did harm to others.\" Pt goal is to have proper medications that \"don't turn him into a corpse.\"     Recommendations: Decrease Impulsivity/Impulsive Behaviors, Encourage Group Attendance, Increase Socialization, Improve/Maintain

## 2025-06-01 PROCEDURE — 1240000000 HC EMOTIONAL WELLNESS R&B

## 2025-06-01 PROCEDURE — 6370000000 HC RX 637 (ALT 250 FOR IP): Performed by: PSYCHIATRY & NEUROLOGY

## 2025-06-01 RX ADMIN — DIVALPROEX SODIUM 250 MG: 250 TABLET, DELAYED RELEASE ORAL at 08:38

## 2025-06-01 RX ADMIN — DIVALPROEX SODIUM 500 MG: 500 TABLET, DELAYED RELEASE ORAL at 20:56

## 2025-06-01 RX ADMIN — PALIPERIDONE 3 MG: 6 TABLET, EXTENDED RELEASE ORAL at 20:56

## 2025-06-01 RX ADMIN — PALIPERIDONE 6 MG: 6 TABLET, EXTENDED RELEASE ORAL at 08:38

## 2025-06-01 NOTE — GROUP NOTE
Group Therapy Note  Patient did not attend group.     Date: 6/1/2025    Group Start Time: 0900  Group End Time: 0945  Group Topic: Psychoeducation    [facility] 3W Dede Kingsley    Group Therapy Note    End of week check-in     Description:   Patients will be given a paper to complete during this intervention. This template includes questions about patient's weeks: how were you feeling this week, what color would describe your week, etc. Patients will be encouraged to share their findings with peers/staff.     Goals:  Improve/Maintain Attention to Task, Improve/Maintain Cognitive Skills, Improve/Maintain Identity Development, Improve/Maintain Self-Expression, and Improve/Maintain Use of Coping Skills        Patient did not attend group.     Signature: Dede Castillo, Psychoeducational Specialist

## 2025-06-01 NOTE — CARE COORDINATION
Pt approached this writer as this writer was walking into 3W, and verbalized \"he needed someone to talk to.\" This writer sat with the pt at a table in the day area and spoke with the pt, upon pt request. Pt spoke about \"missing his home\", \"wanting his plush animal\", and \"feeling more sad than he did yesterday.\" Pt reported he ate his breakfast this morning and took a shower. Pt reported at the end of the interaction that he was going to attempt to sleep in his room, and reported he \"felt better than he did this morning.\"     Signature: Dede Castillo, Psychoeducational Specialist

## 2025-06-01 NOTE — GROUP NOTE
Group Therapy Note    Date: 6/1/2025    Group Start Time: 1200  Group End Time: 1300  Group Topic: Psychoeducation    [facility] 3W Dede Kingsley    Group Therapy Note    Who Are You?     Description:   Patients will be prompted to complete a worksheet that has different open-ended statements, describing who they are. These open-ended statements include explaining what the patient loves, what they want to achieve, what they used to be afraid of, what they are motivated by, what their habits are whether they are healthy/unhealthy, when they are the happiest, what makes them disappointed, what they believe in, and what they will one day achieve. Patients will then be encouraged to speak about their experiences and elaborate.     Goals:   Improve/Maintain Attention to Task, Improve/Maintain Cognitive Skills, Improve/Maintain Identity Development, Improve/Maintain Insight / Self-Awareness, Improve/Maintain Self-Esteem, Improve/Maintain Self-Expression, and Improve/Maintain Use of Coping Skills       Pt was pleasant and sociable during group session. Pt spoke about \"wanting to travel to [place] or [place].\" Pt was engaged in peer conversation, and completed the intervention as directed by this writer.     Attended: Full attendance  Participation Level: Active Listener and Interactive  Participation Quality: Appropriate, Attentive, and Sharing  Affect/Mood: Congruent/Euthymic  Speech: normal rate and normal volume   Insight/Judgement: Fair insight and Fair judgment  Response: Able to verbalize current knowledge/experience, Able to verbalize/acknowledge new learning, Able to retain information, and Capable of insight    Signature: Dede Castillo, Psychoeducational Specialist

## 2025-06-02 PROCEDURE — 99232 SBSQ HOSP IP/OBS MODERATE 35: CPT | Performed by: PSYCHIATRY & NEUROLOGY

## 2025-06-02 PROCEDURE — 6370000000 HC RX 637 (ALT 250 FOR IP): Performed by: PSYCHIATRY & NEUROLOGY

## 2025-06-02 PROCEDURE — 1240000000 HC EMOTIONAL WELLNESS R&B

## 2025-06-02 RX ADMIN — PALIPERIDONE 6 MG: 6 TABLET, EXTENDED RELEASE ORAL at 10:14

## 2025-06-02 RX ADMIN — DIVALPROEX SODIUM 500 MG: 500 TABLET, DELAYED RELEASE ORAL at 20:49

## 2025-06-02 RX ADMIN — PALIPERIDONE 3 MG: 6 TABLET, EXTENDED RELEASE ORAL at 20:49

## 2025-06-02 RX ADMIN — DIVALPROEX SODIUM 250 MG: 250 TABLET, DELAYED RELEASE ORAL at 10:13

## 2025-06-02 NOTE — GROUP NOTE
Date: 6/2/2025    Group Start Time: 1215  Group End Time: 1315  Group Topic: Music Therapy    ML 3W DARSHANMAN Wu Dottie    Emotion Identification Through Classical Music Listening  Music Reminiscence, Receptive Music Listening    Patients will be educated about the wheel of emotions, and learn new emotion vocabulary to help describe more specific feelings. LPMT will provide a brief explanation of various music qualities (tempo, pitch, rhythm, dynamics) that may aid in connecting what they hear in the music to what emotion they think is represented. Patients will listen to a variety of classical pieces and describe why they chose a specific emotion with the emotion wheel / music qualities as an aid. Patients will reflect on how emotion awareness/identification could be related to effective communication skills.    Pieces used:  \"Kaiden Tell Overture\" by Catherine Lambert  \"The Entertainer\" by Scott Plasencia  \"In the [place] of the [employer] Justin\" by Casey Celaya  \"Adagio for Strings, Op.11\" by Tre Mitchell  \"The Planets: I. Mars, The Bringer of War\" by Yony Galicia  \"Symphony No.40 in G minor, K 550, I. Molto Allegro\" by Dejon Bai  \"Flight of the Bumblebee\" by Elaine  \"The [place]\" by Camille Saint-Saens  \"Act II. No. 10, Moderato\" from [place] by Karthik Ulloa  \"New World Symphony, IV. Allegro con leonardo\" by Scar Werner  \"Jeanmarie Terrelle Nachtmusik\" by Dejon Bai  \"Merry-Go-Round of Life\" from [employer]'s [employer] [place]    Focus: Emotion Awareness, Identification,and Regulation  Goals: Improve/Maintain Attention to Task, Improve/Maintain Cognitive Skills, Improve Mood, Improve/Maintain Self-Expression, and Improve/Maintain Use of Coping Skills     Patient listened to recorded classical music and engaged in peer discussions. Patient shared previous knowledge of classical music with the group. Patient provided several responses as to which emotions he felt were represented in the music. Patient compared

## 2025-06-02 NOTE — GROUP NOTE
Date: 6/2/2025    Group Start Time: 0925  Group End Time: 1005  Group Topic: Community Meeting    [facility] [place] 3W Dottie Sepulveda    Goal Setting  Francheska Analysis/Discussion and Receptive Music Listening    Patients will listen to \"I Choose\" by Debra Ponce and discuss lyrics/interpretations derived from the song. Patients will identify ways they can address their physical, emotional, and social needs today. Patients will have the option to share with the group.     Focus: Building Happiness and Motivation  Goals: Improve/Maintain Attention to Task, Improve/Maintain Cognitive Skills, Improve/Maintain Identity Development, Improve/Maintain Insight / Self-Awareness, and Improve/Maintain Self-Expression     Patient listened to recorded music and engaged in peer song discussions. Patient expressed that he felt the song's message was \"good, but not realistic.\" Patient briefly argued with a peer (TS), but was able to remain calm and come to a resolution with peer. Patient shared his frustrations about mental health stigma at his workplace, and that he has difficulty with asking for help. Patient presented as insightful into his mental health. Patient identified that he would meet his social needs today by engaging with peers.     Attended: 3/4-full attendance  Participation Level: Active Listener and Interactive  Participation Quality: Attentive, Sharing, and Supportive  Affect/Mood: Congruent/Euthymic  Speech: spontaneous, normal rate, and normal volume   Thought Content/Processes: Linear  Level of consciousness: Alert  Response: Able to verbalize current knowledge/experience and Able to verbalize/acknowledge new learning  Outcomes: Actively participated in the group experience and Initial interaction with patient    Signature: Dottie Wu, Licensed Professional Music Therapist (LPMT)

## 2025-06-02 NOTE — CARE COORDINATION
FAMILY COLLATERAL NOTE    Family/Support Name: James ( dad ) #   Contact #:[phone]   Relationship to Pt:: Father    LSW reached out to above for collateral information. HIPAA compliant voicemail left for return call.    NATASHA Mera

## 2025-06-02 NOTE — GROUP NOTE
Group Therapy Note    Date: 6/2/2025    Group Start Time: 1015  Group End Time: 1100  Group Topic: Art Therapy     [facility] 3W Shahla Helm, CRISTINOW        Group Therapy Note    Attendees: 14       Patient's Goal:  To participate in morning group art therapy.     Notes:  Patient attended the morning group art therapy. He participated in creating a drawing of a spaulding and stated the spaulding was symbolic of part of his personality. Therapist listened reflectively. Patient seemed to benefit from the intervention.     Status After Intervention:  Unchanged    Participation Level: Active Listener    Participation Quality: Appropriate      Speech:  normal      Thought Process/Content: Logical      Affective Functioning: Congruent      Mood: elevated      Level of consciousness:  Alert      Response to Learning: Able to verbalize current knowledge/experience      Endings: None Reported    Modes of Intervention: Activity      Discipline Responsible: Psychoeducational Specialist      Signature:  Shahla Nash MA ATR LPAT

## 2025-06-03 PROCEDURE — 1240000000 HC EMOTIONAL WELLNESS R&B

## 2025-06-03 PROCEDURE — 6370000000 HC RX 637 (ALT 250 FOR IP): Performed by: PSYCHIATRY & NEUROLOGY

## 2025-06-03 PROCEDURE — 93005 ELECTROCARDIOGRAM TRACING: CPT

## 2025-06-03 RX ADMIN — PALIPERIDONE 3 MG: 6 TABLET, EXTENDED RELEASE ORAL at 21:35

## 2025-06-03 RX ADMIN — DIVALPROEX SODIUM 250 MG: 250 TABLET, DELAYED RELEASE ORAL at 09:22

## 2025-06-03 RX ADMIN — PALIPERIDONE 6 MG: 6 TABLET, EXTENDED RELEASE ORAL at 09:22

## 2025-06-03 RX ADMIN — DIVALPROEX SODIUM 500 MG: 500 TABLET, DELAYED RELEASE ORAL at 21:35

## 2025-06-03 NOTE — GROUP NOTE
Date: 6/3/2025    Group Start Time: 1210  Group End Time: 1245  Group Topic: Music Therapy    [facility] [place] Dottie Sepulveda    Music, Creative Expression, and Validation  Composition, Improvisation, Instrument Playing    Patients will be offered a variety of percussion instruments to choose from. Patients will listen to a series of statements (r/t mood states/feelings, self-perception, beliefs about recovery,etc.), and will play when prompted if they agree with the statement and/or if they would like the statement to be true. Patients will then select an affirmation they feel like they resonate with today, and rhythmically chant the affirmation. Patients will be encouraged to maintain a steady beat on their instrument of choice, and/or improvise different rhythms. Patients will reflect on the experience as a whole.    Focus: Expressive and Interpersonal Communication, Validation  Goals: Increase/Maintain Level of Socialization, Improve Mood, Improve/Maintain Self-Esteem, Improve/Maintain Self-Expression, Improve Use of Coping Skills, Increase Sensory Stimulation, Promote Reality Orientation     Patient utilized the steel tongue drum during group improvisation and played in response to several validation statements. Patient selected the affirmation I believe in myself, and rhythmically chanted/played his and others' affirmations. Patient expressed that he is \"feeling vulnerable\" d/t earlier realizations today, but verbalized support for group.     Attended: Full attendance  Participation Level: Active Listener and Interactive  Participation Quality: Attentive, Sharing, and Supportive  Affect/Mood: Congruent/Euthymic and Brightens  Speech: spontaneous, normal rate, and normal volume   Thought Content/Processes: Linear  Level of consciousness: Alert  Response: Able to verbalize current knowledge/experience  Outcomes: Progressing towards goal and Actively participated in the group experience    Signature: Dottie Wu

## 2025-06-03 NOTE — CARE COORDINATION
FAMILY COLLATERAL NOTE    Family/Support Name: Denny Morillo)  Contact #: # [phone]   Relationship to Pt:: Father      Family/Support contact aware of hospitalization: Yes    Presenting Symptoms/Current Concerns:  -Hx of being on medication as a child for ADHD, not dx for autism (Asperger's) until age 17.  -Patient has overcome a lot of challenges in his life with his autism.  -Very High \"Highs\", and Very low \"lows\"  -Mother has dx of bipolar  -Father is extremely supportive of patient  -Stopped taking meds for 6 months because he was \"feeling good\" and though he did not need it anymore and recently restarted them 6 weeks ago.    Top 3 Life Stressors:   \"No triggers or stressors, everything has been going great\"    Background History Relevant to Current Hospitalization:  Prior inpatient admissions (age 18)    Family Mental Health/Substance Use History:   Mother hx of bipolar    Discharge Plan:   Return home with father    Support Network Supportive of Discharge Plan:   Yes, absolutely    Support can confirm Safety of Location and Security of Weapons:   No weapons    Support agreeable to Safeguard and Monitor Medications (including Prescription and OTC):   Checks in on patient as patient does do his medication himself.    Identified Barriers to Compliance with Discharge Plan:   Nothing that he can think of.    Recommendations for Support Network:   Available for follow up calls.      NATASHA Mera

## 2025-06-03 NOTE — GROUP NOTE
Date: 6/3/2025    Group Start Time: 0930  Group End Time: 1050  Group Topic: Music Therapy    [facility] 3W Dottie Sepulveda    Iso-Principle Playlist  Music Analysis/Discussion, Playlist Building/Song Share, and Receptive Music Listening    Patients will learn about the \"iso-principle\", or the idea of matching music to your current mood in order to gradually change it to a desired mood state. Patients will identify how they feel currently, and how they would like to feel after listening. Patients will brainstorm songs that represent current feelings, songs that represent validation, and songs that represent desired feeling, and then will determine what songs would work as transitions between the current mood and ideal mood. Patients will contribute songs to a group playlist and reflect on their feelings while listening.     Focus: Emotion Identification/Regulation  Goals: Improve/Maintain Identity Development, Improve/Maintain Insight / Self-Awareness, Improve Mood, Improve/Maintain Self-Expression, Improve/Maintain Use of Coping Skills     Patient independently completed his personal playlist. Patient selected a song that represents current feelings and a song that represents desired feelings for inclusion in the group playlist. Patient expressed feeling nervous about sharing his music d/t how vulnerable it is for him to \"show this part of me.\" Patient became tearful while listening, and was receptive to supportive feedback from peers/staff. Patient verbalized support for peers' contributions and voiced that he found the intervention to be beneficial.     Attended: Full attendance  Participation Level: Active Listener and Interactive  Participation Quality: Attentive, Sharing, and Supportive  Affect/Mood: Congruent/Euthymic  Speech: normal rate and normal volume   Thought Content/Processes: Linear  Level of consciousness: Alert  Response: Able to verbalize current knowledge/experience and Able to

## 2025-06-04 VITALS
WEIGHT: 201.4 LBS | OXYGEN SATURATION: 97 % | HEIGHT: 69 IN | HEART RATE: 64 BPM | DIASTOLIC BLOOD PRESSURE: 71 MMHG | SYSTOLIC BLOOD PRESSURE: 102 MMHG | RESPIRATION RATE: 18 BRPM | TEMPERATURE: 97.5 F | BODY MASS INDEX: 29.83 KG/M2

## 2025-06-04 LAB
EKG ATRIAL RATE: 79 BPM
EKG DIAGNOSIS: NORMAL
EKG P AXIS: 68 DEGREES
EKG P-R INTERVAL: 196 MS
EKG Q-T INTERVAL: 364 MS
EKG QRS DURATION: 92 MS
EKG QTC CALCULATION (BAZETT): 417 MS
EKG R AXIS: 16 DEGREES
EKG T AXIS: 41 DEGREES
EKG VENTRICULAR RATE: 79 BPM
VALPROATE SERPL-MCNC: 65.6 UG/ML (ref 50–100)

## 2025-06-04 PROCEDURE — 80164 ASSAY DIPROPYLACETIC ACD TOT: CPT

## 2025-06-04 PROCEDURE — 6370000000 HC RX 637 (ALT 250 FOR IP): Performed by: PSYCHIATRY & NEUROLOGY

## 2025-06-04 PROCEDURE — 36415 COLL VENOUS BLD VENIPUNCTURE: CPT

## 2025-06-04 RX ORDER — PALIPERIDONE 9 MG/1
9 TABLET, EXTENDED RELEASE ORAL DAILY
Qty: 15 TABLET | Refills: 3 | Status: SHIPPED | OUTPATIENT
Start: 2025-06-04

## 2025-06-04 RX ORDER — DIVALPROEX SODIUM 250 MG/1
TABLET, DELAYED RELEASE ORAL
Qty: 45 TABLET | Refills: 3 | Status: SHIPPED | OUTPATIENT
Start: 2025-06-04

## 2025-06-04 RX ORDER — PALIPERIDONE 9 MG/1
9 TABLET, EXTENDED RELEASE ORAL DAILY
Status: DISCONTINUED | OUTPATIENT
Start: 2025-06-04 | End: 2025-06-04 | Stop reason: HOSPADM

## 2025-06-04 RX ADMIN — PALIPERIDONE 9 MG: 9 TABLET, EXTENDED RELEASE ORAL at 10:02

## 2025-06-04 RX ADMIN — DIVALPROEX SODIUM 250 MG: 250 TABLET, DELAYED RELEASE ORAL at 10:02

## 2025-06-04 NOTE — DISCHARGE SUMMARY
DISCHARGE SUMMARY      Patient ID:  Denny Vasquez  [id number]  29 y.o.  1995      Admit date: 5/30/2025    Discharge date and time: 6/4/2025    Admitting Physician: Luiz Vale MD     Discharge Physician: Dr Kavin VALENCIA    Admission Diagnoses: Suicidal ideation [R45.851]  Homicidal ideation [R45.850]  MDD (major depressive disorder), recurrent severe, without psychosis (HCC) [F33.2]    Admission Condition: poor    Discharged Condition: stable    Admission Circumstance:      Denny Vasquez  is a 29 y.o. male with history of autism and visual hallucination interspersed with auditory hallucination and on treatment for psychosis with Invega who was admitted from our emergency department due to intense impulse of suicidal homicidal thought. Patient acknowledges current symptoms which is new at this degree felt very unsafe and his stepmother brought him to the emergency room.  He was medically cleared and his urine tox was positive for cannabis which he takes to time to time to control the anxiety and agitation.     Upon evaluation this morning he was calm cooperative but he said that last night and a few days ago he was not sure whether he will be able to keep it together.  He reported thoughts of hurting others and himself by slashing his head against a wall and said he fears he could hurt others.  His parents are very supportive.  He said he takes Invega 6 mg 1 PM every day.  He said he walks.  He mentioned multiple times that he is fearful that he was going to hurt himself.  He reports he wanted to slam his head against the wall.  According to the collateral information he has been annoyed and angry very easily.  He said he has been having intrusive thoughts of hurting himself and others which is new for him.  He has never been hospitalized to inpatient.  He said he was very worried about this feeling and that is why he came to the emergency department.  He felt threatened when his blood was taken in the ED.

## 2025-06-04 NOTE — GROUP NOTE
Date: 6/4/2025    Group Start Time: 0935  Group End Time: 1050  Group Topic: Music Therapy    [facility] [place] 3W Dottie Sepulveda    Live Music Song Choices  Live Music Listening and Re-creative Singing    Patients will be given a songbook and offered the opportunity to select (a) song(s) of their choice for live music listening on [employer]. Patients will be encouraged to sing along, move along, and listen to the music.    Focus: Building Positive Experiences and Relaxation   Goals: Increase/Maintain Level of Socialization, Improve Mood, Improve/Maintain Self-Esteem, Improve/Maintain Use of Coping Skills, Increase Sensory Stimulation     Patient selected songs for live music listening and sang along to familiar music. Patient socialized with peers/staff throughout. Patient verbalized appreciation for groups during his admission, and expressed that he was proud of how much he opened up about his feelings.    Attended: 1/2-3/4 attendance  Participation Level: Active Listener and Interactive  Participation Quality: Attentive, Sharing, and Supportive  Affect/Mood: Congruent/Euthymic and Brightens  Speech: spontaneous, normal rate, and normal volume   Thought Content/Processes: Linear  Level of consciousness: Alert  Response: Able to verbalize current knowledge/experience  Outcomes: Successfully internalized the purpose of intervention, Actively participated in the group experience, and Anticipated discharge today    Signature: Dottie Wu, Licensed Professional Music Therapist (LPMT)

## 2025-06-04 NOTE — DISCHARGE INSTR - DIET

## 2025-06-04 NOTE — PROGRESS NOTES
4 eyes skin check done on arrival, with Janna NUNO . No contraband found, no lesions noted. Oriented to unit and routine.Belongings were checked in .   
BEHAVIORAL HEALTH FOLLOW-UP NOTE     6/1/2025     Patient was seen and examined in person, Chart reviewed   Patient's case discussed with staff/team    Chief Complaint: Suicidal homicidal ideations    Interim History:   I saw the patient this morning out on the unit.  He was attending group.  He tells me that he is feeling \"a little bit blue.\"  He states is because he is homesick and he misses the comforts from home such as a blanket and a plus she.  He states that he wants to focus on his emotions while he is here in the hospital.  He believes the medications are helping him to be able to focus more on his emotions and keep his emotions even.  He denies suicidal or homicidal ideations intent or plan denies auditory or visual hallucinations no behavior disturbances noted        Appetite: [x] Normal/Unchanged  [] Increased  [] Decreased      Sleep:       [x] Normal/Unchanged  [] Fair       [] Poor              Energy:    [x] Normal/Unchanged  [] Increased  [] Decreased        SI [] Present  [x] Absent    HI  []Present  [x] Absent     Aggression:  [] yes  [x] no    Patient is [x] able  [] unable to CONTRACT FOR SAFETY     PAST MEDICAL/PSYCHIATRIC HISTORY:   No past medical history on file.    FAMILY/SOCIAL HISTORY:  No family history on file.  Social History     Socioeconomic History    Marital status: Single     Spouse name: Not on file    Number of children: Not on file    Years of education: Not on file    Highest education level: Not on file   Occupational History    Not on file   Tobacco Use    Smoking status: Never    Smokeless tobacco: Never   Substance and Sexual Activity    Alcohol use: Not on file    Drug use: Not on file    Sexual activity: Not on file   Other Topics Concern    Not on file   Social History Narrative    Not on file     Social Drivers of Health     Financial Resource Strain: Low Risk  (4/7/2023)    Overall Financial Resource Strain (CARDIA)     Difficulty of Paying Living Expenses: Not hard 
CLINICAL PHARMACY NOTE: MEDS TO BEDS    Total # of Prescriptions Filled: 2   The following medications were delivered to the patient:  Paliperidone ER 9mg Tab   Divalproex DR 250mg Tab     Additional Documentation:    
Patient is visible on unit and is seen attending groups, socializing and walking unit. Patient denies SI, HI AVT hallucinations. Patient also denies feelings of depression however he states that he feels homesick at times. Patient is able to make needs known and verbally agrees to maintain safety while on unit.   Electronically signed by Shavonne Cobian LPN on 6/2/2025 at 1:31 PM      
Patient is visible on unit. Patient is seen walking the unit socially with select peers. Patient denies SI, HI AVT hallucinations however patient is seen laughing to self at inappropriate times. Patient is discharged focused. Calm and cooperative. Patient is able to make needs known and verbally agrees to maintain safety while on unit.   Electronically signed by Shavonne Cobian LPN on 6/3/2025 at 1:18 PM      
Progress Note    Date:6/3/2025       [facility]  Patient Name:Denny Vasquez     YOB: 1995     Age:29 y.o.    Assessment        Hospital Problems           Last Modified POA    * (Principal) Bipolar affective disorder, mixed, severe, with psychotic behavior (HCC) 5/31/2025 Yes    Autistic disorder 5/31/2025 Yes       Plan:      *Bipolar affective disorder  - Psychiatry managing      *GERD  -Pantoprazole 40 mg with as needed Maalox  - Advised not to lie down 30 minutes after eating a meal    *Obesity  - Diet/lifestyle modifications  *Marijuana use  - Advised not to use    Additional work up or/and treatment plan may be added today or then after based on clinical progression by other providers or specialists.  Patient will need to follow-up with PCP for chronic disease management.     I have spent greater than 70% of time  spent focused exclusively on this patient ,reviewing  chart, labs/diagnostics, reconciling medications, &  answering questions with patient and discussing plan.    Subjective   Interval History Status: Patient request to be seen for reports of indigestion.  Reports burning sensation mid epigastric region.  Denies chest pain shortness of breath or radiation.  Reports worse after eating and lying down.        Review of Systems   12 point review of systems reviewed with patient; negative other than as mentioned    Medications   Scheduled Meds:    paliperidone  6 mg Oral Daily    paliperidone  3 mg Oral Nightly    divalproex  250 mg Oral Daily    divalproex  500 mg Oral Nightly     Continuous Infusions:   PRN Meds: acetaminophen, magnesium hydroxide, aluminum & magnesium hydroxide-simethicone, benztropine mesylate, traZODone, haloperidol **OR** haloperidol lactate, hydrOXYzine pamoate **OR** hydrOXYzine    Past History    Past Medical History:   has no past medical history on file.    Social History:   reports that he has never smoked. He has never used smokeless tobacco.     Family 
Pt given all discharge information and instructions and able to verbalize an understanding of same. Pt signed all discharge documentation. Pt denies any suicidal or homicidal ideations or hallucinations.    
Pt is noted up on the unit says he has showered this am already and this helps with his anxiety, reports depression # 4-5, anxiety #2, denied voices or suicidal thoughts, pt denied feeling irritated with thoughts to harm people, reports good sleep, pt is noted to appear stiff upon getting up from dinning table this am , that this is not unusual for him in the am to have this problem, denies any problems with eating /swallowing, Vs obtained.  
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 Pt denied thoughts to hurt other here.  
Pt left unit with staff, escorted to lobby and collected by family for ride home. Belonging given to pt. Denies any current SI, HI or AVH. Mood and affect stable.    
Pt spent the evening in the dayroom socializing and coloring. He is pleasant towards staff and his peers. Pt denies SI/HI/AVH. Per pt he has not experienced hallucinations in a long time because his medications prevent them. He admits to low levels of depression and anxiety. He rates both 2 out of 10 on a 1-10 scale with 10 being the worst. He has fair contact and appears well groomed. He said he did npt eat as much as usual but he believes it is d/t being here because he doesn't have difficulty at home.   
Activity: Not on file   Stress: Not on file   Social Connections: Not on file   Intimate Partner Violence: Not on file   Housing Stability: Low Risk  (5/31/2025)    Housing Stability Vital Sign     Unable to Pay for Housing in the Last Year: No     Number of Times Moved in the Last Year: 0     Homeless in the Last Year: No           ROS:  [x] All negative/unchanged except if checked. Explain positive(checked items) below:  [] Constitutional  [] Eyes  [] Ear/Nose/Mouth/Throat  [] Respiratory  [] CV  [] GI  []   [] Musculoskeletal  [] Skin/Breast  [] Neurological  [] Endocrine  [] Heme/Lymph  [] Allergic/Immunologic    Explanation:     MEDICATIONS:    Current Facility-Administered Medications:     paliperidone (INVEGA) extended release tablet 6 mg, 6 mg, Oral, Daily, Azucena Beltran MD, 6 mg at 06/02/25 1014    paliperidone (INVEGA) extended release tablet 3 mg, 3 mg, Oral, Nightly, Azucena Beltran MD, 3 mg at 06/01/25 2056    divalproex (DEPAKOTE) DR tablet 250 mg, 250 mg, Oral, Daily, Azucena Beltran MD, 250 mg at 06/02/25 1013    divalproex (DEPAKOTE) DR tablet 500 mg, 500 mg, Oral, Nightly, Azucena Beltran MD, 500 mg at 06/01/25 2056    acetaminophen (TYLENOL) tablet 650 mg, 650 mg, Oral, Q4H PRN, Luiz Vale MD    magnesium hydroxide (MILK OF MAGNESIA) 400 MG/5ML suspension 30 mL, 30 mL, Oral, Daily PRN, Luiz Vale MD    aluminum & magnesium hydroxide-simethicone (MAALOX PLUS) 200-200-20 MG/5ML suspension 30 mL, 30 mL, Oral, PRN, Luiz Vale MD    benztropine mesylate (COGENTIN) injection 2 mg, 2 mg, IntraMUSCular, BID PRN, Luiz Vale MD    traZODone (DESYREL) tablet 50 mg, 50 mg, Oral, Nightly PRN, Luiz Vale MD    haloperidol (HALDOL) tablet 5 mg, 5 mg, Oral, Q6H PRN **OR** haloperidol lactate (HALDOL) injection 5 mg, 5 mg, IntraMUSCular, Q6H PRN, Luiz Vale MD    hydrOXYzine pamoate (VISTARIL) capsule 50 mg, 50 mg, Oral, Q6H PRN **OR** hydrOXYzine 
Results   Component Value Date/Time    TSH 2.760 05/30/2025 03:22 PM    TSH 2.910 03/31/2023 05:44 AM     No results found for: \"LITHIUM\"  No results found for: \"VALPROATE\", \"CBMZ\"        Treatment Plan:  Reviewed current Medications with the patient.   Risks, benefits, side effects, drug-to-drug interactions and alternatives to treatment were discussed.  Collateral information:   CD evaluation  Encourage patient to attend group and other milieu activities.  Discharge planning discussed with the patient and treatment team.    PSYCHOTHERAPY/COUNSELING:  [x] Therapeutic interview  [x] Supportive  [] CBT  [] Ongoing  [] Other      Patient was seen 1:1 for 20 minutes, other than E&M time spent, focusing on      - coping skills techniques     - Anxiety management techniques discussed including deep breathing exercise and PMR     - discussing patients strength and weakness        Electronically signed by KULWANT FUNES MD on 6/3/2025 at 4:25 PM

## 2025-06-04 NOTE — PLAN OF CARE
Problem: Anxiety  Goal: Will report anxiety at manageable levels  Description: INTERVENTIONS:1. Administer medication as ordered2. Teach and rehearse alternative coping skills3. Provide emotional support with 1:1 interaction with staff  Outcome: Progressing     Problem: Coping  Goal: Pt/Family able to verbalize concerns and demonstrate effective coping strategies  Description: INTERVENTIONS:1. Assist patient/family to identify coping skills, available support systems and cultural and spiritual values2. Provide emotional support, including active listening and acknowledgement of concerns of patient and caregivers3. Reduce environmental stimuli, as able4. Instruct patient/family in relaxation techniques, as appropriate5. Assess for spiritual pain/suffering and initiate Spiritual Care, Psychosocial Clinical Specialist consults as needed  Outcome: Progressing     Problem: Confusion  Goal: Confusion, delirium, dementia, or psychosis is improved or at baseline  Description: INTERVENTIONS:1. Assess for possible contributors to thought disturbance, including medications, impaired vision or hearing, underlying metabolic abnormalities, dehydration, psychiatric diagnoses, and notify attending LIP2. [place] high risk fall precautions, as indicated3. Provide frequent short contacts to provide reality reorientation, refocusing and direction4. Decrease environmental stimuli, including noise as appropriate5. Monitor and intervene to maintain adequate nutrition, hydration, elimination, sleep and activity6. If unable to ensure safety without constant attention obtain sitter and review sitter guidelines with assigned personnel7. Initiate Psychosocial CNS and Spiritual Care consult, as indicated  Outcome: Progressing     Problem: Behavior  Goal: Pt/Family maintain appropriate behavior and adhere to behavioral management agreement, if implemented  Description: INTERVENTIONS:1. Assess patient/family's coping skills and  non-compliant 
  Problem: Anxiety  Goal: Will report anxiety at manageable levels  Description: INTERVENTIONS:1. Administer medication as ordered2. Teach and rehearse alternative coping skills3. Provide emotional support with 1:1 interaction with staff  Outcome: Progressing  Flowsheets (Taken 6/1/2025 1240)  Will report anxiety at manageable levels:   Administer medication as ordered   Teach and rehearse alternative coping skills   Provide emotional support with 1:1 interaction with staff     Problem: Coping  Goal: Pt/Family able to verbalize concerns and demonstrate effective coping strategies  Description: INTERVENTIONS:1. Assist patient/family to identify coping skills, available support systems and cultural and spiritual values2. Provide emotional support, including active listening and acknowledgement of concerns of patient and caregivers3. Reduce environmental stimuli, as able4. Instruct patient/family in relaxation techniques, as appropriate5. Assess for spiritual pain/suffering and initiate Spiritual Care, Psychosocial Clinical Specialist consults as needed  Outcome: Progressing  Flowsheets (Taken 6/1/2025 1240)  Patient/family able to verbalize anxieties, fears, and concerns, and demonstrate effective coping:   Reduce environmental stimuli, as able   Instruct patient/family in relaxation techniques, as appropriate     Problem: Confusion  Goal: Confusion, delirium, dementia, or psychosis is improved or at baseline  Description: INTERVENTIONS:1. Assess for possible contributors to thought disturbance, including medications, impaired vision or hearing, underlying metabolic abnormalities, dehydration, psychiatric diagnoses, and notify attending LIP2. [place] high risk fall precautions, as indicated3. Provide frequent short contacts to provide reality reorientation, refocusing and direction4. Decrease environmental stimuli, including noise as appropriate5. Monitor and intervene to maintain adequate nutrition, hydration, 
  Problem: Discharge Planning  Goal: Discharge to home or other facility with appropriate resources  6/2/2025 1951 by Diana Meza RN  Outcome: Progressing  6/2/2025 1334 by Lilly Wagoner RN  Outcome: Progressing     Problem: Anxiety  Goal: Will report anxiety at manageable levels  Description: INTERVENTIONS:1. Administer medication as ordered2. Teach and rehearse alternative coping skills3. Provide emotional support with 1:1 interaction with staff  6/2/2025 1951 by Diana Meza RN  Outcome: Progressing  Flowsheets (Taken 6/2/2025 1336 by Lilly Wagoner RN)  Will report anxiety at manageable levels: Administer medication as ordered  6/2/2025 1334 by Lilly Wagoner RN  Outcome: Progressing     Problem: Coping  Goal: Pt/Family able to verbalize concerns and demonstrate effective coping strategies  Description: INTERVENTIONS:1. Assist patient/family to identify coping skills, available support systems and cultural and spiritual values2. Provide emotional support, including active listening and acknowledgement of concerns of patient and caregivers3. Reduce environmental stimuli, as able4. Instruct patient/family in relaxation techniques, as appropriate5. Assess for spiritual pain/suffering and initiate Spiritual Care, Psychosocial Clinical Specialist consults as needed  6/2/2025 1951 by Diana Meza RN  Outcome: Progressing  Flowsheets (Taken 6/2/2025 1336 by Lilly Wagoner RN)  Patient/family able to verbalize anxieties, fears, and concerns, and demonstrate effective coping: Reduce environmental stimuli, as able  6/2/2025 1334 by Lilly Wagoner RN  Outcome: Progressing     Problem: Confusion  Goal: Confusion, delirium, dementia, or psychosis is improved or at baseline  Description: INTERVENTIONS:1. Assess for possible contributors to thought disturbance, including medications, impaired vision or hearing, underlying metabolic abnormalities, dehydration, psychiatric 
  Problem: Discharge Planning  Goal: Discharge to home or other facility with appropriate resources  6/3/2025 0944 by Lilly Wagoner RN  Outcome: Progressing  6/2/2025 1951 by Diana Meza RN  Outcome: Progressing     Problem: Anxiety  Goal: Will report anxiety at manageable levels  Description: INTERVENTIONS:1. Administer medication as ordered2. Teach and rehearse alternative coping skills3. Provide emotional support with 1:1 interaction with staff  6/3/2025 0944 by Lilly Wagoner RN  Outcome: Progressing  6/2/2025 1951 by Diana Meza RN  Outcome: Progressing  Flowsheets (Taken 6/2/2025 1336 by Lilly Wagoner RN)  Will report anxiety at manageable levels: Administer medication as ordered     Problem: Coping  Goal: Pt/Family able to verbalize concerns and demonstrate effective coping strategies  Description: INTERVENTIONS:1. Assist patient/family to identify coping skills, available support systems and cultural and spiritual values2. Provide emotional support, including active listening and acknowledgement of concerns of patient and caregivers3. Reduce environmental stimuli, as able4. Instruct patient/family in relaxation techniques, as appropriate5. Assess for spiritual pain/suffering and initiate Spiritual Care, Psychosocial Clinical Specialist consults as needed  6/3/2025 0944 by Lilly Wagoner RN  Outcome: Progressing  6/2/2025 1951 by Diana Meza RN  Outcome: Progressing  Flowsheets (Taken 6/2/2025 1336 by Lilly Wagoner RN)  Patient/family able to verbalize anxieties, fears, and concerns, and demonstrate effective coping: Reduce environmental stimuli, as able     Problem: Confusion  Goal: Confusion, delirium, dementia, or psychosis is improved or at baseline  Description: INTERVENTIONS:1. Assess for possible contributors to thought disturbance, including medications, impaired vision or hearing, underlying metabolic abnormalities, dehydration, psychiatric 
  Problem: Discharge Planning  Goal: Discharge to home or other facility with appropriate resources  6/3/2025 2240 by Diana Meza RN  Outcome: Progressing  6/3/2025 0944 by Lilly Wagoner RN  Outcome: Progressing     Problem: Anxiety  Goal: Will report anxiety at manageable levels  Description: INTERVENTIONS:1. Administer medication as ordered2. Teach and rehearse alternative coping skills3. Provide emotional support with 1:1 interaction with staff  6/3/2025 2240 by Diana Meza RN  Outcome: Progressing  6/3/2025 0944 by Lilly Wagoner RN  Outcome: Progressing  Flowsheets (Taken 6/3/2025 0944)  Will report anxiety at manageable levels: Administer medication as ordered     Problem: Coping  Goal: Pt/Family able to verbalize concerns and demonstrate effective coping strategies  Description: INTERVENTIONS:1. Assist patient/family to identify coping skills, available support systems and cultural and spiritual values2. Provide emotional support, including active listening and acknowledgement of concerns of patient and caregivers3. Reduce environmental stimuli, as able4. Instruct patient/family in relaxation techniques, as appropriate5. Assess for spiritual pain/suffering and initiate Spiritual Care, Psychosocial Clinical Specialist consults as needed  6/3/2025 2240 by Diana Meza RN  Outcome: Progressing  6/3/2025 0944 by Lilly Wagoner RN  Outcome: Progressing  Flowsheets (Taken 6/3/2025 0944)  Patient/family able to verbalize anxieties, fears, and concerns, and demonstrate effective coping: Reduce environmental stimuli, as able     Problem: Confusion  Goal: Confusion, delirium, dementia, or psychosis is improved or at baseline  Description: INTERVENTIONS:1. Assess for possible contributors to thought disturbance, including medications, impaired vision or hearing, underlying metabolic abnormalities, dehydration, psychiatric diagnoses, and notify attending LIP2. [place] high risk fall 
  Problem: Discharge Planning  Goal: Discharge to home or other facility with appropriate resources  Outcome: Progressing     Problem: Anxiety  Goal: Will report anxiety at manageable levels  Description: INTERVENTIONS:1. Administer medication as ordered2. Teach and rehearse alternative coping skills3. Provide emotional support with 1:1 interaction with staff  Outcome: Progressing     Problem: Coping  Goal: Pt/Family able to verbalize concerns and demonstrate effective coping strategies  Description: INTERVENTIONS:1. Assist patient/family to identify coping skills, available support systems and cultural and spiritual values2. Provide emotional support, including active listening and acknowledgement of concerns of patient and caregivers3. Reduce environmental stimuli, as able4. Instruct patient/family in relaxation techniques, as appropriate5. Assess for spiritual pain/suffering and initiate Spiritual Care, Psychosocial Clinical Specialist consults as needed  Outcome: Progressing     Problem: Confusion  Goal: Confusion, delirium, dementia, or psychosis is improved or at baseline  Description: INTERVENTIONS:1. Assess for possible contributors to thought disturbance, including medications, impaired vision or hearing, underlying metabolic abnormalities, dehydration, psychiatric diagnoses, and notify attending LIP2. [place] high risk fall precautions, as indicated3. Provide frequent short contacts to provide reality reorientation, refocusing and direction4. Decrease environmental stimuli, including noise as appropriate5. Monitor and intervene to maintain adequate nutrition, hydration, elimination, sleep and activity6. If unable to ensure safety without constant attention obtain sitter and review sitter guidelines with assigned personnel7. Initiate Psychosocial CNS and Spiritual Care consult, as indicated  Outcome: Progressing     Problem: Behavior  Goal: Pt/Family maintain appropriate behavior and adhere to behavioral 
Patient visible on unit. Affect bright and reactive upon approach. Pt attended recreation group. Pt describes his mood as \"less up and down\" and \"less irritable\" Pt denies SI, HI and AVH. Pt does report \"missing home\" Pt compliant with medications no additional concerns verbalized to staff.   Problem: Anxiety  Goal: Will report anxiety at manageable levels  Outcome: Progressing     Problem: Coping  Goal: Pt/Family able to verbalize concerns and demonstrate effective coping strategies  Outcome: Progressing     Problem: Confusion  Goal: Confusion, delirium, dementia, or psychosis is improved or at baseline  Outcome: Progressing     Problem: Behavior  Goal: Pt/Family maintain appropriate behavior and adhere to behavioral management agreement, if implemented  Outcome: Progressing     Problem: Depression/Self Harm  Goal: Effect of psychiatric condition will be minimized and patient will be protected from self harm  Outcome: Progressing     Problem: Involuntary Admit  Goal: Will cooperate with staff recommendations and doctor's orders and will demonstrate appropriate behavior  Outcome: Progressing     
agreement, if implemented: Encourage participation in the decision making process about the behavioral management agreement     Problem: Depression/Self Harm  Goal: Effect of psychiatric condition will be minimized and patient will be protected from self harm  Description: INTERVENTIONS:1. Assess impact of patient's symptoms on level of functioning, self care needs and offer support as indicated2. Assess patient/family knowledge of depression, impact on illness and need for teaching3. Provide emotional support, presence and reassurance4. Assess for possible suicidal thoughts or ideation. If patient expresses suicidal thoughts or statements do not leave alone, initiate Suicide Precautions, move to a room close to the nursing station and obtain sitter5. Initiate consults as appropriate with Mental Health Professional, Spiritual Care, Psychosocial CNS, and consider a recommendation to the LIP for a Psychiatric Consultation  6/1/2025 2323 by Scooter Mazariegos RN  Outcome: Progressing  Flowsheets (Taken 6/1/2025 1944 by Mellissa Sneed, RN)  Effect of psychiatric condition will be minimized and patient will be protected from self harm:   Assess impact of patient’s symptoms on level of functioning, self care needs and offer support as indicated   Assess patient/family knowledge of depression, impact on illness and need for teaching   Provide emotional support, presence and reassurance   Assess for suicidal thoughts or ideation. If patient expresses suicidal thoughts or statements do not leave alone, initiate Suicide Precautions, move near nurse station, obtain sitter   Initiate consults as appropriate with Mental Health Professional, Spiritual Care, Psychosocial CNS, and consider a recommendation to the LIP for a Psychiatric Consultation  6/1/2025 1243 by Sandie Vizcaino, RN  Outcome: Progressing  Flowsheets (Taken 6/1/2025 1240)  Effect of psychiatric condition will be minimized and patient will be protected from self harm:

## 2025-06-04 NOTE — DISCHARGE INSTRUCTIONS
Keep all follow up appointments, take medications as ordered, utilize positive supports, abstain from use of alcohol and drugs. If symptoms return or you feel at risk to yourself or others, please call 911, return the nearest emergency room, or call your local crisis hotline:  [facility]: [phone]  [facility]: [phone]  [facility]: [phone]     Due to the Covid-19 Pandemic, [facility] is not currently available. For assistance with quitting smoking please go to https://smokefree.gov. A prescription for an FDA-approved tobacco cessation medication was offered at discharge and the patient refused.    Someone from [facility] will be calling you tomorrow to follow up on your care. If you don't hear from us, give us a call! [phone].